# Patient Record
Sex: MALE | Race: WHITE | NOT HISPANIC OR LATINO | Employment: FULL TIME | URBAN - METROPOLITAN AREA
[De-identification: names, ages, dates, MRNs, and addresses within clinical notes are randomized per-mention and may not be internally consistent; named-entity substitution may affect disease eponyms.]

---

## 2023-04-23 DIAGNOSIS — K21.00 GASTROESOPHAGEAL REFLUX DISEASE WITH ESOPHAGITIS, UNSPECIFIED WHETHER HEMORRHAGE: Primary | ICD-10-CM

## 2023-04-25 PROBLEM — K21.9 ESOPHAGEAL REFLUX: Status: ACTIVE | Noted: 2023-04-25

## 2023-04-25 RX ORDER — PANTOPRAZOLE SODIUM 40 MG/1
TABLET, DELAYED RELEASE ORAL
Qty: 90 TABLET | Refills: 3 | Status: SHIPPED | OUTPATIENT
Start: 2023-04-25 | End: 2024-04-03 | Stop reason: SDUPTHER

## 2023-06-21 DIAGNOSIS — Z00.00 PREVENTATIVE HEALTH CARE: Primary | ICD-10-CM

## 2023-06-21 RX ORDER — IBUPROFEN 600 MG/1
TABLET ORAL
Qty: 90 TABLET | Refills: 1 | Status: SHIPPED | OUTPATIENT
Start: 2023-06-21 | End: 2023-09-08

## 2023-08-22 ENCOUNTER — LAB (OUTPATIENT)
Dept: LAB | Facility: LAB | Age: 63
End: 2023-08-22
Payer: COMMERCIAL

## 2023-08-22 ENCOUNTER — OFFICE VISIT (OUTPATIENT)
Dept: PRIMARY CARE | Facility: CLINIC | Age: 63
End: 2023-08-22
Payer: COMMERCIAL

## 2023-08-22 VITALS
HEART RATE: 93 BPM | DIASTOLIC BLOOD PRESSURE: 77 MMHG | BODY MASS INDEX: 30.31 KG/M2 | WEIGHT: 187.8 LBS | SYSTOLIC BLOOD PRESSURE: 126 MMHG

## 2023-08-22 DIAGNOSIS — K74.69 OTHER CIRRHOSIS OF LIVER (MULTI): Primary | ICD-10-CM

## 2023-08-22 DIAGNOSIS — Z00.00 PREVENTATIVE HEALTH CARE: ICD-10-CM

## 2023-08-22 DIAGNOSIS — E11.69 TYPE 2 DIABETES MELLITUS WITH OTHER SPECIFIED COMPLICATION, UNSPECIFIED WHETHER LONG TERM INSULIN USE (MULTI): ICD-10-CM

## 2023-08-22 DIAGNOSIS — K21.9 GASTROESOPHAGEAL REFLUX DISEASE WITHOUT ESOPHAGITIS: ICD-10-CM

## 2023-08-22 DIAGNOSIS — I10 BENIGN ESSENTIAL HYPERTENSION: ICD-10-CM

## 2023-08-22 PROBLEM — J30.9 ALLERGIC RHINITIS: Status: ACTIVE | Noted: 2023-08-22

## 2023-08-22 PROBLEM — M54.50 LOWER BACK PAIN: Status: ACTIVE | Noted: 2023-08-22

## 2023-08-22 PROBLEM — E78.5 HYPERLIPIDEMIA: Status: ACTIVE | Noted: 2023-08-22

## 2023-08-22 PROBLEM — J20.9 ACUTE BRONCHITIS: Status: RESOLVED | Noted: 2023-08-22 | Resolved: 2023-08-22

## 2023-08-22 PROBLEM — E66.9 OBESITY: Status: ACTIVE | Noted: 2023-08-22

## 2023-08-22 PROBLEM — S92.354A CLOSED NONDISPLACED FRACTURE OF FIFTH RIGHT METATARSAL BONE: Status: RESOLVED | Noted: 2023-08-22 | Resolved: 2023-08-22

## 2023-08-22 PROBLEM — S92.354A CLOSED NONDISPLACED FRACTURE OF FIFTH RIGHT METATARSAL BONE: Status: ACTIVE | Noted: 2023-08-22

## 2023-08-22 PROBLEM — J20.9 ACUTE BRONCHITIS: Status: ACTIVE | Noted: 2023-08-22

## 2023-08-22 PROBLEM — K29.60 REFLUX GASTRITIS: Status: ACTIVE | Noted: 2023-08-22

## 2023-08-22 PROBLEM — D69.6 THROMBOCYTOPENIA (CMS-HCC): Status: ACTIVE | Noted: 2023-08-22

## 2023-08-22 PROBLEM — G47.19 EXCESSIVE DAYTIME SLEEPINESS: Status: ACTIVE | Noted: 2023-08-22

## 2023-08-22 PROBLEM — E78.00 HYPERCHOLESTEROLEMIA: Status: ACTIVE | Noted: 2023-08-22

## 2023-08-22 PROBLEM — E11.9 DIABETES MELLITUS (MULTI): Status: ACTIVE | Noted: 2023-08-22

## 2023-08-22 PROBLEM — M20.21 ACQUIRED HALLUX RIGIDUS OF RIGHT FOOT: Status: ACTIVE | Noted: 2023-08-22

## 2023-08-22 PROBLEM — U07.1 COVID-19 VIRUS INFECTION: Status: ACTIVE | Noted: 2023-08-22

## 2023-08-22 PROBLEM — M51.26 LUMBAR DISC HERNIATION: Status: ACTIVE | Noted: 2023-08-22

## 2023-08-22 PROBLEM — M72.2 PLANTAR FASCIAL FIBROMATOSIS: Status: ACTIVE | Noted: 2017-06-26

## 2023-08-22 PROBLEM — N20.0 NEPHROLITHIASIS: Status: ACTIVE | Noted: 2023-08-22

## 2023-08-22 PROBLEM — K74.60 CIRRHOSIS (MULTI): Status: ACTIVE | Noted: 2023-08-22

## 2023-08-22 LAB
ALANINE AMINOTRANSFERASE (SGPT) (U/L) IN SER/PLAS: 37 U/L (ref 10–52)
ALBUMIN (G/DL) IN SER/PLAS: 4.9 G/DL (ref 3.4–5)
ALKALINE PHOSPHATASE (U/L) IN SER/PLAS: 47 U/L (ref 33–136)
ANION GAP IN SER/PLAS: 16 MMOL/L (ref 10–20)
ASPARTATE AMINOTRANSFERASE (SGOT) (U/L) IN SER/PLAS: 29 U/L (ref 9–39)
BASOPHILS (10*3/UL) IN BLOOD BY AUTOMATED COUNT: 0.07 X10E9/L (ref 0–0.1)
BASOPHILS/100 LEUKOCYTES IN BLOOD BY AUTOMATED COUNT: 0.9 % (ref 0–2)
BILIRUBIN TOTAL (MG/DL) IN SER/PLAS: 1.2 MG/DL (ref 0–1.2)
CALCIUM (MG/DL) IN SER/PLAS: 9.9 MG/DL (ref 8.6–10.6)
CARBON DIOXIDE, TOTAL (MMOL/L) IN SER/PLAS: 28 MMOL/L (ref 21–32)
CHLORIDE (MMOL/L) IN SER/PLAS: 101 MMOL/L (ref 98–107)
CHOLESTEROL (MG/DL) IN SER/PLAS: 148 MG/DL (ref 0–199)
CHOLESTEROL IN HDL (MG/DL) IN SER/PLAS: 65.1 MG/DL
CHOLESTEROL/HDL RATIO: 2.3
CREATININE (MG/DL) IN SER/PLAS: 0.96 MG/DL (ref 0.5–1.3)
EOSINOPHILS (10*3/UL) IN BLOOD BY AUTOMATED COUNT: 0.12 X10E9/L (ref 0–0.7)
EOSINOPHILS/100 LEUKOCYTES IN BLOOD BY AUTOMATED COUNT: 1.5 % (ref 0–6)
ERYTHROCYTE DISTRIBUTION WIDTH (RATIO) BY AUTOMATED COUNT: 14.5 % (ref 11.5–14.5)
ERYTHROCYTE MEAN CORPUSCULAR HEMOGLOBIN CONCENTRATION (G/DL) BY AUTOMATED: 30.8 G/DL (ref 32–36)
ERYTHROCYTE MEAN CORPUSCULAR VOLUME (FL) BY AUTOMATED COUNT: 84 FL (ref 80–100)
ERYTHROCYTES (10*6/UL) IN BLOOD BY AUTOMATED COUNT: 6.01 X10E12/L (ref 4.5–5.9)
GFR MALE: 88 ML/MIN/1.73M2
GLUCOSE (MG/DL) IN SER/PLAS: 129 MG/DL (ref 74–99)
HEMATOCRIT (%) IN BLOOD BY AUTOMATED COUNT: 50.6 % (ref 41–52)
HEMOGLOBIN (G/DL) IN BLOOD: 15.6 G/DL (ref 13.5–17.5)
IMMATURE GRANULOCYTES/100 LEUKOCYTES IN BLOOD BY AUTOMATED COUNT: 0.2 % (ref 0–0.9)
LDL: 64 MG/DL (ref 0–99)
LEUKOCYTES (10*3/UL) IN BLOOD BY AUTOMATED COUNT: 8.2 X10E9/L (ref 4.4–11.3)
LYMPHOCYTES (10*3/UL) IN BLOOD BY AUTOMATED COUNT: 1.04 X10E9/L (ref 1.2–4.8)
LYMPHOCYTES/100 LEUKOCYTES IN BLOOD BY AUTOMATED COUNT: 12.7 % (ref 13–44)
MONOCYTES (10*3/UL) IN BLOOD BY AUTOMATED COUNT: 0.63 X10E9/L (ref 0.1–1)
MONOCYTES/100 LEUKOCYTES IN BLOOD BY AUTOMATED COUNT: 7.7 % (ref 2–10)
NEUTROPHILS (10*3/UL) IN BLOOD BY AUTOMATED COUNT: 6.28 X10E9/L (ref 1.2–7.7)
NEUTROPHILS/100 LEUKOCYTES IN BLOOD BY AUTOMATED COUNT: 77 % (ref 40–80)
NRBC (PER 100 WBCS) BY AUTOMATED COUNT: 0 /100 WBC (ref 0–0)
PLATELETS (10*3/UL) IN BLOOD AUTOMATED COUNT: 155 X10E9/L (ref 150–450)
POC APPEARANCE, URINE: CLEAR
POC BILIRUBIN, URINE: NEGATIVE
POC BLOOD, URINE: NEGATIVE
POC COLOR, URINE: YELLOW
POC GLUCOSE, URINE: ABNORMAL MG/DL
POC HEMOGLOBIN A1C: 7.2 % (ref 4.2–6.5)
POC KETONES, URINE: ABNORMAL MG/DL
POC LEUKOCYTES, URINE: NEGATIVE
POC NITRITE,URINE: NEGATIVE
POC PH, URINE: 6 PH
POC PROTEIN, URINE: NEGATIVE MG/DL
POC SPECIFIC GRAVITY, URINE: 1.01
POC UROBILINOGEN, URINE: 1 EU/DL
POTASSIUM (MMOL/L) IN SER/PLAS: 5.6 MMOL/L (ref 3.5–5.3)
PROSTATE SPECIFIC AG (NG/ML) IN SER/PLAS: 1.52 NG/ML (ref 0–4)
PROTEIN TOTAL: 7.7 G/DL (ref 6.4–8.2)
SODIUM (MMOL/L) IN SER/PLAS: 139 MMOL/L (ref 136–145)
TRIGLYCERIDE (MG/DL) IN SER/PLAS: 93 MG/DL (ref 0–149)
UREA NITROGEN (MG/DL) IN SER/PLAS: 26 MG/DL (ref 6–23)
VLDL: 19 MG/DL (ref 0–40)

## 2023-08-22 PROCEDURE — 83036 HEMOGLOBIN GLYCOSYLATED A1C: CPT | Performed by: INTERNAL MEDICINE

## 2023-08-22 PROCEDURE — 81003 URINALYSIS AUTO W/O SCOPE: CPT | Performed by: INTERNAL MEDICINE

## 2023-08-22 PROCEDURE — 85025 COMPLETE CBC W/AUTO DIFF WBC: CPT

## 2023-08-22 PROCEDURE — 4010F ACE/ARB THERAPY RXD/TAKEN: CPT | Performed by: INTERNAL MEDICINE

## 2023-08-22 PROCEDURE — 80053 COMPREHEN METABOLIC PANEL: CPT

## 2023-08-22 PROCEDURE — 90677 PCV20 VACCINE IM: CPT | Performed by: INTERNAL MEDICINE

## 2023-08-22 PROCEDURE — 93000 ELECTROCARDIOGRAM COMPLETE: CPT | Performed by: INTERNAL MEDICINE

## 2023-08-22 PROCEDURE — 84153 ASSAY OF PSA TOTAL: CPT

## 2023-08-22 PROCEDURE — 80061 LIPID PANEL: CPT

## 2023-08-22 PROCEDURE — 3074F SYST BP LT 130 MM HG: CPT | Performed by: INTERNAL MEDICINE

## 2023-08-22 PROCEDURE — 3078F DIAST BP <80 MM HG: CPT | Performed by: INTERNAL MEDICINE

## 2023-08-22 PROCEDURE — 36415 COLL VENOUS BLD VENIPUNCTURE: CPT

## 2023-08-22 PROCEDURE — 99397 PER PM REEVAL EST PAT 65+ YR: CPT | Performed by: INTERNAL MEDICINE

## 2023-08-22 PROCEDURE — 90471 IMMUNIZATION ADMIN: CPT | Performed by: INTERNAL MEDICINE

## 2023-08-22 RX ORDER — PIOGLITAZONEHYDROCHLORIDE 15 MG/1
15 TABLET ORAL DAILY
COMMUNITY
Start: 2022-04-11 | End: 2024-01-08

## 2023-08-22 RX ORDER — EMPAGLIFLOZIN 25 MG/1
25 TABLET, FILM COATED ORAL DAILY
COMMUNITY
Start: 2017-02-08 | End: 2023-09-25

## 2023-08-22 RX ORDER — RAMIPRIL 5 MG/1
5 CAPSULE ORAL DAILY
COMMUNITY
Start: 2013-09-13 | End: 2023-09-25

## 2023-08-22 RX ORDER — MAGNESIUM 200 MG
1 TABLET ORAL 2 TIMES DAILY
COMMUNITY
Start: 2021-06-23

## 2023-08-22 RX ORDER — METFORMIN HYDROCHLORIDE 1000 MG/1
1000 TABLET ORAL
COMMUNITY
Start: 2014-08-10 | End: 2023-09-25

## 2023-08-22 RX ORDER — DULAGLUTIDE 4.5 MG/.5ML
4.5 INJECTION, SOLUTION SUBCUTANEOUS
COMMUNITY
Start: 2018-04-06 | End: 2024-03-11

## 2023-08-22 RX ORDER — FLUTICASONE PROPIONATE 50 MCG
2 SPRAY, SUSPENSION (ML) NASAL DAILY PRN
COMMUNITY
Start: 2013-04-15

## 2023-08-22 RX ORDER — SIMVASTATIN 40 MG/1
40 TABLET, FILM COATED ORAL DAILY
COMMUNITY
Start: 2013-04-15 | End: 2023-09-25

## 2023-08-22 RX ORDER — ASPIRIN 81 MG/1
81 TABLET ORAL DAILY
COMMUNITY

## 2023-08-22 NOTE — PROGRESS NOTES
Subjective   Patient ID: Teofilo Osei is a 63 y.o. male who presents for Follow-up.  WELLNESS VISIT. STILL ON COMMERCIAL INSURANCE  5th metatarsal fx healed well.  Back is sore daily each AM. Interferes with sleep unless he uses ibuprofen before bed. Sleeps well at night after 300mg ibuprofen in middle of night.  Colonoscopy is due 2028.  PSA due today.  ? Hearing.  Due for Prevnar.  Had eye exam with Dr. Collins. All DM issues were nonexistent.    Notes dyspnea with stairs. No angina.        Current Outpatient Medications   Medication Instructions    aspirin 81 mg, oral, Daily    cetirizine (ZYRTEC) 10 mg, oral, Daily    fluticasone (Flonase) 50 mcg/actuation nasal spray 2 sprays, Each Nostril, Daily PRN    ibuprofen 600 mg tablet TAKE 1 TABLET 3 TIMES DAILYAS NEEDED    Jardiance 25 mg, oral, Daily    magnesium 200 mg tablet 1 tablet, oral, 2 times daily    metFORMIN (GLUCOPHAGE) 1,000 mg, oral, 2 times daily with meals    pantoprazole (ProtoNix) 40 mg EC tablet TAKE 1 TABLET DAILY    pioglitazone (ACTOS) 15 mg, oral, Daily    ramipril (ALTACE) 5 mg, oral, Daily    simvastatin (ZOCOR) 40 mg, oral, Daily    Trulicity 4.5 mg, subcutaneous, Weekly     Review of Systems  General: Denies weakness, fever, anorexia. Denies significant change in weight.  HEENT: Denies HA, vision change or problem, tinnitus, voice or swallowing problems.  Resp: Denies SOB, cough, or wheezing.  CV: Denies chest pain or pressure, palpitations, syncope, edema, or claudication.  GI : Denies abdominal pain, diarrhea, constipation, heartburn, rectal bleeding, or change in bowel habits. Bowels better with addition of Mg++.  : Denies urinary frequency, pain, blood, incontinence, or nocturia.  Sexual: No sexual health or reproductive system concerns.  MSK: Denies significant musculoskeletal pains or limitations EXCEPT AS FOLLOWS...  Neuro: Denies tingling, numbness, weakness, tremor, balance problems, falls, or memory loss.  Psych: Denies Mood or  sleep issues.  Derm: No unusual lesions, moles or rashes.    Objective   Physical Exam  Constitutional: Alert and in no acute distress  Inspection of Eyes: Sclera and conjunctivae normal.  Pupil exam: PERRL. EOMI.  Tympanic membranes are normal. External ears appear normal.   Oropharynx: Normal with MMM.   Neck: Thyroid normal. No cervical lymphadenopathy. No carotid bruit.  No cervical, axillary, or inguinal lymphadenopathy.  Breasts: No masses.   Lungs are clear to auscultation and percussion.  Cardiac: S1 and S2 are normal. No murmurs, rubs, or gallops. No edema.   Abdomen: Soft, nontender. Normal bowel sounds. No hepatosplenomegaly or masses.  Musculoskeletal: Examination of gait is normal. No clubbing or cyanosis. Full range of motion of joints. NO swelling, tenderness, or limitation of motion.  Skin normal skin color and pigmentation. No visible rash. Nml skin turgor.  Neurological: Cranial nerves II-XII intact. Deep tendon reflexes 2+ and symmetric .No focal motor weakness. Sensation and vibration are normal. No pronator drift. Coordination normal. Balance normal.  Psychiatric: A&Ox3. Mood and affect normal.      Assessment/Plan   Problem List Items Addressed This Visit       Esophageal reflux    Benign essential hypertension     Well controlled. Continue Current Management         Cirrhosis (CMS/HCC) - Primary     Liver enzymes, PT/INR, US liver         Relevant Orders    US abdomen limited liver    Diabetes mellitus (CMS/HCC)     Degree of control is marginal. Due for urine albumin.         Relevant Orders    POCT glycosylated hemoglobin (Hb A1C) manually resulted (Completed)    Albumin , Urine Random    ECG 12 lead     Other Visit Diagnoses       Preventative health care        Relevant Orders    Pneumococcal conjugate vaccine, 20-valent, adult (PREVNAR 20)    Lipid Panel    Comprehensive Metabolic Panel    CBC and Auto Differential    POCT UA Automated manually resulted    Prostate Specific Antigen           F/U 3 months.

## 2023-08-23 NOTE — RESULT ENCOUNTER NOTE
Thad,  These tests indicate that your diabetes could be better controlled, as I alluded to at your visit.  Job

## 2023-08-23 NOTE — RESULT ENCOUNTER NOTE
Dear Patient,    GOOD NEWS    Attached to this note is a copy of the testing that I have ordered.The results indicate that there are no significant abnormalities in your results, even if some of the findings are reported as abnormal.    Please continue your current treatment plan as we have discussed and return for follow up as planned.    Do not hesitate to contact me if you have any questions or concerns.    Sincerely,  Job Ramos M.D.

## 2023-09-07 DIAGNOSIS — Z00.00 PREVENTATIVE HEALTH CARE: ICD-10-CM

## 2023-09-08 RX ORDER — IBUPROFEN 600 MG/1
600 TABLET ORAL 3 TIMES DAILY PRN
Qty: 90 TABLET | Refills: 1 | Status: SHIPPED | OUTPATIENT
Start: 2023-09-08 | End: 2023-11-15 | Stop reason: SDUPTHER

## 2023-09-22 DIAGNOSIS — I10 BENIGN ESSENTIAL HYPERTENSION: ICD-10-CM

## 2023-09-22 DIAGNOSIS — E11.69 TYPE 2 DIABETES MELLITUS WITH OTHER SPECIFIED COMPLICATION, UNSPECIFIED WHETHER LONG TERM INSULIN USE (MULTI): Primary | ICD-10-CM

## 2023-09-25 RX ORDER — SIMVASTATIN 40 MG/1
40 TABLET, FILM COATED ORAL EVERY EVENING
Qty: 90 TABLET | Refills: 3 | Status: SHIPPED | OUTPATIENT
Start: 2023-09-25

## 2023-09-25 RX ORDER — RAMIPRIL 5 MG/1
5 CAPSULE ORAL DAILY
Qty: 90 CAPSULE | Refills: 3 | Status: SHIPPED | OUTPATIENT
Start: 2023-09-25

## 2023-09-25 RX ORDER — EMPAGLIFLOZIN 25 MG/1
25 TABLET, FILM COATED ORAL DAILY
Qty: 90 TABLET | Refills: 3 | Status: SHIPPED | OUTPATIENT
Start: 2023-09-25 | End: 2024-05-14 | Stop reason: SDUPTHER

## 2023-09-25 RX ORDER — METFORMIN HYDROCHLORIDE 1000 MG/1
1000 TABLET ORAL EVERY 12 HOURS
Qty: 180 TABLET | Refills: 3 | Status: SHIPPED | OUTPATIENT
Start: 2023-09-25

## 2023-11-06 ENCOUNTER — TELEPHONE (OUTPATIENT)
Dept: PRIMARY CARE | Facility: CLINIC | Age: 63
End: 2023-11-06
Payer: COMMERCIAL

## 2023-11-06 NOTE — TELEPHONE ENCOUNTER
Pt is having swelling and pain rt side jaw pain. He would like to see if you ar able to squeeze him in Please advise.

## 2023-11-07 ENCOUNTER — OFFICE VISIT (OUTPATIENT)
Dept: PRIMARY CARE | Facility: CLINIC | Age: 63
End: 2023-11-07
Payer: COMMERCIAL

## 2023-11-07 VITALS
TEMPERATURE: 98.6 F | HEART RATE: 87 BPM | SYSTOLIC BLOOD PRESSURE: 132 MMHG | DIASTOLIC BLOOD PRESSURE: 71 MMHG | WEIGHT: 192.4 LBS | BODY MASS INDEX: 31.05 KG/M2

## 2023-11-07 DIAGNOSIS — R59.0 CERVICAL LYMPHADENOPATHY: ICD-10-CM

## 2023-11-07 DIAGNOSIS — J06.9 UPPER RESPIRATORY TRACT INFECTION, UNSPECIFIED TYPE: Primary | ICD-10-CM

## 2023-11-07 PROCEDURE — 3075F SYST BP GE 130 - 139MM HG: CPT | Performed by: INTERNAL MEDICINE

## 2023-11-07 PROCEDURE — 3078F DIAST BP <80 MM HG: CPT | Performed by: INTERNAL MEDICINE

## 2023-11-07 PROCEDURE — 99213 OFFICE O/P EST LOW 20 MIN: CPT | Performed by: INTERNAL MEDICINE

## 2023-11-07 PROCEDURE — 4010F ACE/ARB THERAPY RXD/TAKEN: CPT | Performed by: INTERNAL MEDICINE

## 2023-11-07 RX ORDER — AMOXICILLIN AND CLAVULANATE POTASSIUM 875; 125 MG/1; MG/1
875 TABLET, FILM COATED ORAL 2 TIMES DAILY
Qty: 14 TABLET | Refills: 0 | Status: SHIPPED | OUTPATIENT
Start: 2023-11-07 | End: 2023-11-14

## 2023-11-07 NOTE — PROGRESS NOTES
Subjective   Patient ID: Teofilo Osei is a 63 y.o. male who presents for No chief complaint on file..  URI since Thursday, COVID NEGATIVE. Developed swelling in R posterior cervical LN. Tender. No fever. Non-productive cough.      Current Outpatient Medications   Medication Instructions    aspirin 81 mg, oral, Daily    cetirizine (ZYRTEC) 10 mg, oral, Daily    fluticasone (Flonase) 50 mcg/actuation nasal spray 2 sprays, Each Nostril, Daily PRN     mg, oral, 3 times daily PRN    Jardiance 25 mg, oral, Daily    magnesium 200 mg tablet 1 tablet, oral, 2 times daily    metFORMIN (GLUCOPHAGE) 1,000 mg, oral, Every 12 hours    pantoprazole (ProtoNix) 40 mg EC tablet TAKE 1 TABLET DAILY    pioglitazone (ACTOS) 15 mg, oral, Daily    ramipril (ALTACE) 5 mg, oral, Daily    simvastatin (ZOCOR) 40 mg, oral, Every evening    Trulicity 4.5 mg, subcutaneous, Weekly     Review of Systems   All other systems reviewed and are negative.      Objective   Physical Exam  Constitutional:       Appearance: Normal appearance.   HENT:      Right Ear: Tympanic membrane, ear canal and external ear normal.      Left Ear: There is impacted cerumen.      Ears:      Comments: Tender and swolen R posterior auricular lymphadenopathy v. Parotid.     Mouth/Throat:      Mouth: Mucous membranes are moist.      Pharynx: No oropharyngeal exudate or posterior oropharyngeal erythema.   Eyes:      Extraocular Movements: Extraocular movements intact.      Pupils: Pupils are equal, round, and reactive to light.   Cardiovascular:      Rate and Rhythm: Normal rate and regular rhythm.   Pulmonary:      Effort: Pulmonary effort is normal. No respiratory distress.      Comments: Occasional ronchus R lung.  Lymphadenopathy:      Cervical: No cervical adenopathy.   Neurological:      Mental Status: He is alert.           Assessment/Plan   Diagnoses and all orders for this visit:  Upper respiratory tract infection, unspecified type  -     amoxicillin-pot  clavulanate (Augmentin) 875-125 mg tablet; Take 1 tablet (875 mg) by mouth 2 times a day for 7 days.  Cervical lymphadenopathy  -     amoxicillin-pot clavulanate (Augmentin) 875-125 mg tablet; Take 1 tablet (875 mg) by mouth 2 times a day for 7 days.       Call if not better.

## 2023-11-14 ENCOUNTER — OFFICE VISIT (OUTPATIENT)
Dept: PRIMARY CARE | Facility: CLINIC | Age: 63
End: 2023-11-14
Payer: COMMERCIAL

## 2023-11-14 VITALS
TEMPERATURE: 97.6 F | HEART RATE: 75 BPM | DIASTOLIC BLOOD PRESSURE: 73 MMHG | SYSTOLIC BLOOD PRESSURE: 110 MMHG | WEIGHT: 194 LBS | BODY MASS INDEX: 31.31 KG/M2

## 2023-11-14 DIAGNOSIS — E11.9 TYPE 2 DIABETES MELLITUS WITHOUT COMPLICATION, WITHOUT LONG-TERM CURRENT USE OF INSULIN (MULTI): Primary | ICD-10-CM

## 2023-11-14 DIAGNOSIS — I10 BENIGN ESSENTIAL HYPERTENSION: ICD-10-CM

## 2023-11-14 PROBLEM — U07.1 COVID-19 VIRUS INFECTION: Status: RESOLVED | Noted: 2023-08-22 | Resolved: 2023-11-14

## 2023-11-14 LAB — POC HEMOGLOBIN A1C: 7.8 % (ref 4.2–6.5)

## 2023-11-14 PROCEDURE — 83036 HEMOGLOBIN GLYCOSYLATED A1C: CPT | Performed by: INTERNAL MEDICINE

## 2023-11-14 PROCEDURE — 4010F ACE/ARB THERAPY RXD/TAKEN: CPT | Performed by: INTERNAL MEDICINE

## 2023-11-14 PROCEDURE — 1126F AMNT PAIN NOTED NONE PRSNT: CPT | Performed by: INTERNAL MEDICINE

## 2023-11-14 PROCEDURE — 3074F SYST BP LT 130 MM HG: CPT | Performed by: INTERNAL MEDICINE

## 2023-11-14 PROCEDURE — 3078F DIAST BP <80 MM HG: CPT | Performed by: INTERNAL MEDICINE

## 2023-11-14 PROCEDURE — 99214 OFFICE O/P EST MOD 30 MIN: CPT | Performed by: INTERNAL MEDICINE

## 2023-11-14 ASSESSMENT — PAIN SCALES - GENERAL: PAINLEVEL: 0-NO PAIN

## 2023-11-14 NOTE — ASSESSMENT & PLAN NOTE
Worsening diabetic control.  Hemoglobin A1c equals 7.8%.  I have once again recommended daily glucose monitoring.  Dietary restrictions are reinforced.  Follow-up mid February.  Goal is hemoglobin A1c less than 7% by then.  May need to consider a continuous glucose monitor to enhance awareness of the glycemic effects of food.

## 2023-11-14 NOTE — PROGRESS NOTES
Subjective   Patient ID: Teofilo Osei is a 63 y.o. male who presents for Follow-up (Pt still experiencing slight cough ).  URI sx and LN have improved. Finished ATB. No diarrhea.  Thad continues to note hearing trouble, mostly in larger crowds with ambient noise.    DM: He has not been been complying with his diet. He has been eating candy. No CP. He has stopped checking his glucose. He has stopped weighing himself.      Current Outpatient Medications   Medication Instructions    amoxicillin-pot clavulanate (Augmentin) 875-125 mg tablet 875 mg, oral, 2 times daily    aspirin 81 mg, oral, Daily    cetirizine (ZYRTEC) 10 mg, oral, Daily    fluticasone (Flonase) 50 mcg/actuation nasal spray 2 sprays, Each Nostril, Daily PRN     mg, oral, 3 times daily PRN    Jardiance 25 mg, oral, Daily    magnesium 200 mg tablet 1 tablet, oral, 2 times daily    metFORMIN (GLUCOPHAGE) 1,000 mg, oral, Every 12 hours    pantoprazole (ProtoNix) 40 mg EC tablet TAKE 1 TABLET DAILY    pioglitazone (ACTOS) 15 mg, oral, Daily    ramipril (ALTACE) 5 mg, oral, Daily    simvastatin (ZOCOR) 40 mg, oral, Every evening    Trulicity 4.5 mg, subcutaneous, Weekly     Review of Systems   All other systems reviewed and are negative.      Objective   /73 (BP Location: Left arm, Patient Position: Sitting, BP Cuff Size: Adult)   Pulse 75   Temp 36.4 °C (97.6 °F) (Oral)   Wt 88 kg (194 lb)   BMI 31.31 kg/m²   Physical Exam  Constitutional: Alert and in no acute distress  Inspection of Eyes: Sclera and conjunctivae normal.  Pupil exam: PERRL. EOMI.  Neck: Thyroid normal. No cervical lymphadenopathy.  Lungs are clear to auscultation and percussion.  Cardiac: S1 and S2 are normal. No murmurs, rubs, or gallops. No edema.   Musculoskeletal: Examination of gait is normal. No clubbing or cyanosis.   Skin normal skin color and pigmentation. No visible rash. Nml skin turgor.  Neurological: Cranial nerves II-XII intact. No focal motor weakness.  Coordination normal. Balance normal.  Psychiatric: A&Ox3. Mood and affect normal.      Assessment/Plan   Problem List Items Addressed This Visit             ICD-10-CM    Benign essential hypertension I10     Well controlled. Continue Current Management         Diabetes mellitus (CMS/[facility]) - Primary E11.9     Worsening diabetic control.  Hemoglobin A1c equals 7.8%.  I have once again recommended daily glucose monitoring.  Dietary restrictions are reinforced.  Follow-up mid February.  Goal is hemoglobin A1c less than 7% by then.  May need to consider a continuous glucose monitor to enhance awareness of the glycemic effects of food.          Respiratory infection has resolved.

## 2023-11-15 DIAGNOSIS — Z00.00 PREVENTATIVE HEALTH CARE: ICD-10-CM

## 2023-11-15 RX ORDER — IBUPROFEN 600 MG/1
600 TABLET ORAL 3 TIMES DAILY PRN
Qty: 90 TABLET | Refills: 1 | Status: SHIPPED | OUTPATIENT
Start: 2023-11-15 | End: 2023-12-26

## 2023-12-26 DIAGNOSIS — Z00.00 PREVENTATIVE HEALTH CARE: ICD-10-CM

## 2023-12-26 RX ORDER — IBUPROFEN 600 MG/1
TABLET ORAL
Qty: 90 TABLET | Refills: 1 | Status: SHIPPED | OUTPATIENT
Start: 2023-12-26 | End: 2024-02-12

## 2024-01-04 DIAGNOSIS — E11.9 TYPE 2 DIABETES MELLITUS WITHOUT COMPLICATION, WITHOUT LONG-TERM CURRENT USE OF INSULIN (MULTI): Primary | ICD-10-CM

## 2024-01-08 RX ORDER — PIOGLITAZONEHYDROCHLORIDE 15 MG/1
15 TABLET ORAL DAILY
Qty: 90 TABLET | Refills: 3 | Status: SHIPPED | OUTPATIENT
Start: 2024-01-08 | End: 2024-02-14 | Stop reason: SDUPTHER

## 2024-02-12 DIAGNOSIS — Z00.00 PREVENTATIVE HEALTH CARE: ICD-10-CM

## 2024-02-12 RX ORDER — IBUPROFEN 600 MG/1
TABLET ORAL
Qty: 90 TABLET | Refills: 1 | Status: SHIPPED | OUTPATIENT
Start: 2024-02-12 | End: 2024-04-03

## 2024-02-14 ENCOUNTER — LAB (OUTPATIENT)
Dept: LAB | Facility: LAB | Age: 64
End: 2024-02-14
Payer: COMMERCIAL

## 2024-02-14 ENCOUNTER — OFFICE VISIT (OUTPATIENT)
Dept: PRIMARY CARE | Facility: CLINIC | Age: 64
End: 2024-02-14
Payer: COMMERCIAL

## 2024-02-14 VITALS
DIASTOLIC BLOOD PRESSURE: 85 MMHG | WEIGHT: 191.6 LBS | SYSTOLIC BLOOD PRESSURE: 142 MMHG | HEART RATE: 97 BPM | BODY MASS INDEX: 30.93 KG/M2 | TEMPERATURE: 97.3 F

## 2024-02-14 DIAGNOSIS — E11.9 TYPE 2 DIABETES MELLITUS WITHOUT COMPLICATION, WITHOUT LONG-TERM CURRENT USE OF INSULIN (MULTI): ICD-10-CM

## 2024-02-14 DIAGNOSIS — I10 BENIGN ESSENTIAL HYPERTENSION: Primary | ICD-10-CM

## 2024-02-14 LAB
ALBUMIN SERPL BCP-MCNC: 5.4 G/DL (ref 3.4–5)
ALP SERPL-CCNC: 51 U/L (ref 33–136)
ALT SERPL W P-5'-P-CCNC: 37 U/L (ref 10–52)
ANION GAP SERPL CALC-SCNC: 16 MMOL/L (ref 10–20)
AST SERPL W P-5'-P-CCNC: 28 U/L (ref 9–39)
BASOPHILS # BLD AUTO: 0.07 X10*3/UL (ref 0–0.1)
BASOPHILS NFR BLD AUTO: 1 %
BILIRUB SERPL-MCNC: 1 MG/DL (ref 0–1.2)
BUN SERPL-MCNC: 27 MG/DL (ref 6–23)
CALCIUM SERPL-MCNC: 10.4 MG/DL (ref 8.6–10.6)
CHLORIDE SERPL-SCNC: 99 MMOL/L (ref 98–107)
CO2 SERPL-SCNC: 29 MMOL/L (ref 21–32)
CREAT SERPL-MCNC: 1.03 MG/DL (ref 0.5–1.3)
EGFRCR SERPLBLD CKD-EPI 2021: 81 ML/MIN/1.73M*2
EOSINOPHIL # BLD AUTO: 0.21 X10*3/UL (ref 0–0.7)
EOSINOPHIL NFR BLD AUTO: 2.9 %
ERYTHROCYTE [DISTWIDTH] IN BLOOD BY AUTOMATED COUNT: 14.5 % (ref 11.5–14.5)
GLUCOSE SERPL-MCNC: 206 MG/DL (ref 74–99)
HCT VFR BLD AUTO: 52.1 % (ref 41–52)
HGB BLD-MCNC: 16.7 G/DL (ref 13.5–17.5)
IMM GRANULOCYTES # BLD AUTO: 0.02 X10*3/UL (ref 0–0.7)
IMM GRANULOCYTES NFR BLD AUTO: 0.3 % (ref 0–0.9)
LYMPHOCYTES # BLD AUTO: 1.18 X10*3/UL (ref 1.2–4.8)
LYMPHOCYTES NFR BLD AUTO: 16.4 %
MCH RBC QN AUTO: 26.5 PG (ref 26–34)
MCHC RBC AUTO-ENTMCNC: 32.1 G/DL (ref 32–36)
MCV RBC AUTO: 83 FL (ref 80–100)
MONOCYTES # BLD AUTO: 0.71 X10*3/UL (ref 0.1–1)
MONOCYTES NFR BLD AUTO: 9.9 %
NEUTROPHILS # BLD AUTO: 4.99 X10*3/UL (ref 1.2–7.7)
NEUTROPHILS NFR BLD AUTO: 69.5 %
NRBC BLD-RTO: 0 /100 WBCS (ref 0–0)
PLATELET # BLD AUTO: 191 X10*3/UL (ref 150–450)
POC HEMOGLOBIN A1C: 7.6 % (ref 4.2–6.5)
POTASSIUM SERPL-SCNC: 4.8 MMOL/L (ref 3.5–5.3)
PROT SERPL-MCNC: 7.9 G/DL (ref 6.4–8.2)
RBC # BLD AUTO: 6.31 X10*6/UL (ref 4.5–5.9)
SODIUM SERPL-SCNC: 139 MMOL/L (ref 136–145)
WBC # BLD AUTO: 7.2 X10*3/UL (ref 4.4–11.3)

## 2024-02-14 PROCEDURE — 83036 HEMOGLOBIN GLYCOSYLATED A1C: CPT | Performed by: INTERNAL MEDICINE

## 2024-02-14 PROCEDURE — 1126F AMNT PAIN NOTED NONE PRSNT: CPT | Performed by: INTERNAL MEDICINE

## 2024-02-14 PROCEDURE — 3077F SYST BP >= 140 MM HG: CPT | Performed by: INTERNAL MEDICINE

## 2024-02-14 PROCEDURE — 3079F DIAST BP 80-89 MM HG: CPT | Performed by: INTERNAL MEDICINE

## 2024-02-14 PROCEDURE — 99214 OFFICE O/P EST MOD 30 MIN: CPT | Performed by: INTERNAL MEDICINE

## 2024-02-14 PROCEDURE — 36415 COLL VENOUS BLD VENIPUNCTURE: CPT

## 2024-02-14 PROCEDURE — 85025 COMPLETE CBC W/AUTO DIFF WBC: CPT

## 2024-02-14 PROCEDURE — 4010F ACE/ARB THERAPY RXD/TAKEN: CPT | Performed by: INTERNAL MEDICINE

## 2024-02-14 PROCEDURE — 80053 COMPREHEN METABOLIC PANEL: CPT

## 2024-02-14 RX ORDER — PIOGLITAZONEHYDROCHLORIDE 30 MG/1
30 TABLET ORAL DAILY
Qty: 90 TABLET | Refills: 3 | Status: SHIPPED | OUTPATIENT
Start: 2024-02-14 | End: 2025-02-13

## 2024-02-14 ASSESSMENT — PAIN SCALES - GENERAL: PAINLEVEL: 0-NO PAIN

## 2024-02-14 NOTE — ASSESSMENT & PLAN NOTE
This is elevated and more so than last time.  Will have to monitor and reevaluate. There is room to increase ramipril if needed, if elevation persists.

## 2024-02-14 NOTE — PROGRESS NOTES
Subjective   Patient ID: Teofilo Osei is a 63 y.o. male who presents for No chief complaint on file..  In for follow up.  No new health complaints.  AM glucose - 110-140.  No hospital visits.  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      Current Outpatient Medications   Medication Instructions    aspirin 81 mg, oral, Daily    cetirizine (ZYRTEC) 10 mg, oral, Daily    fluticasone (Flonase) 50 mcg/actuation nasal spray 2 sprays, Each Nostril, Daily PRN     mg tablet TAKE 1 TABLET 3 TIMES DAILYAS NEEDED FOR MILD PAIN    (1-3).    Jardiance 25 mg, oral, Daily    magnesium 200 mg tablet 1 tablet, oral, 2 times daily    metFORMIN (GLUCOPHAGE) 1,000 mg, oral, Every 12 hours    pantoprazole (ProtoNix) 40 mg EC tablet TAKE 1 TABLET DAILY    pioglitazone (ACTOS) 30 mg, oral, Daily    ramipril (ALTACE) 5 mg, oral, Daily    simvastatin (ZOCOR) 40 mg, oral, Every evening    Trulicity 4.5 mg, subcutaneous, Weekly     Review of Systems  All other systems are reviewed and are without complaint.    Objective   /85   Pulse 97   Temp 36.3 °C (97.3 °F)   Wt 86.9 kg (191 lb 9.6 oz)   BMI 30.93 kg/m²   Physical Exam  Constitutional: Alert and in no acute distress  Inspection of Eyes: Sclera and conjunctivae normal.  Pupil exam: PERRL. EOMI.  Neck: Thyroid normal. No cervical lymphadenopathy.  Lungs are clear to auscultation and percussion.  Cardiac: S1 and S2 are normal. No murmurs, rubs, or gallops. No edema.   Musculoskeletal: Examination of gait is normal. No clubbing or cyanosis.   Skin normal skin color and pigmentation. No visible rash. Nml skin turgor.  Neurological: Cranial nerves II-XII intact. No focal motor weakness. Coordination normal. Balance normal.  Psychiatric: A&Ox3. Mood and affect normal.      Assessment/Plan   Problem List Items Addressed This Visit             ICD-10-CM    Benign essential hypertension - Primary I10     This is elevated and more so than last time.  Will have to  monitor and reevaluate. There is room to increase ramipril if needed, if elevation persists.         Diabetes mellitus (CMS/HCC) E11.9     Strive for increase of pioglitazone to 30mg. Follow-up in 3 months.         Relevant Medications    pioglitazone (Actos) 30 mg tablet

## 2024-03-08 DIAGNOSIS — E11.9 TYPE 2 DIABETES MELLITUS WITHOUT COMPLICATION, WITHOUT LONG-TERM CURRENT USE OF INSULIN (MULTI): Primary | ICD-10-CM

## 2024-03-11 RX ORDER — DULAGLUTIDE 4.5 MG/.5ML
INJECTION, SOLUTION SUBCUTANEOUS
Qty: 6 ML | Refills: 3 | Status: SHIPPED | OUTPATIENT
Start: 2024-03-11 | End: 2024-05-31 | Stop reason: ALTCHOICE

## 2024-04-03 DIAGNOSIS — Z00.00 PREVENTATIVE HEALTH CARE: ICD-10-CM

## 2024-04-03 DIAGNOSIS — K21.00 GASTROESOPHAGEAL REFLUX DISEASE WITH ESOPHAGITIS, UNSPECIFIED WHETHER HEMORRHAGE: ICD-10-CM

## 2024-04-03 RX ORDER — IBUPROFEN 600 MG/1
TABLET ORAL
Qty: 90 TABLET | Refills: 3 | Status: SHIPPED | OUTPATIENT
Start: 2024-04-03

## 2024-04-03 RX ORDER — PANTOPRAZOLE SODIUM 40 MG/1
40 TABLET, DELAYED RELEASE ORAL DAILY
Qty: 90 TABLET | Refills: 3 | Status: SHIPPED | OUTPATIENT
Start: 2024-04-03

## 2024-05-14 DIAGNOSIS — E11.69 TYPE 2 DIABETES MELLITUS WITH OTHER SPECIFIED COMPLICATION, UNSPECIFIED WHETHER LONG TERM INSULIN USE (MULTI): ICD-10-CM

## 2024-05-15 ENCOUNTER — OFFICE VISIT (OUTPATIENT)
Dept: PRIMARY CARE | Facility: CLINIC | Age: 64
End: 2024-05-15
Payer: COMMERCIAL

## 2024-05-15 VITALS
HEART RATE: 91 BPM | TEMPERATURE: 98.5 F | DIASTOLIC BLOOD PRESSURE: 66 MMHG | SYSTOLIC BLOOD PRESSURE: 105 MMHG | WEIGHT: 195 LBS | BODY MASS INDEX: 31.47 KG/M2

## 2024-05-15 DIAGNOSIS — E11.69 TYPE 2 DIABETES MELLITUS WITH OTHER SPECIFIED COMPLICATION, UNSPECIFIED WHETHER LONG TERM INSULIN USE (MULTI): Primary | ICD-10-CM

## 2024-05-15 DIAGNOSIS — K74.69 OTHER CIRRHOSIS OF LIVER (MULTI): ICD-10-CM

## 2024-05-15 DIAGNOSIS — I10 BENIGN ESSENTIAL HYPERTENSION: ICD-10-CM

## 2024-05-15 LAB — POC HEMOGLOBIN A1C: 7.6 % (ref 4.2–6.5)

## 2024-05-15 PROCEDURE — 3074F SYST BP LT 130 MM HG: CPT | Performed by: INTERNAL MEDICINE

## 2024-05-15 PROCEDURE — 99214 OFFICE O/P EST MOD 30 MIN: CPT | Performed by: INTERNAL MEDICINE

## 2024-05-15 PROCEDURE — 1036F TOBACCO NON-USER: CPT | Performed by: INTERNAL MEDICINE

## 2024-05-15 PROCEDURE — 1126F AMNT PAIN NOTED NONE PRSNT: CPT | Performed by: INTERNAL MEDICINE

## 2024-05-15 PROCEDURE — 3078F DIAST BP <80 MM HG: CPT | Performed by: INTERNAL MEDICINE

## 2024-05-15 PROCEDURE — 83036 HEMOGLOBIN GLYCOSYLATED A1C: CPT | Performed by: INTERNAL MEDICINE

## 2024-05-15 PROCEDURE — 4010F ACE/ARB THERAPY RXD/TAKEN: CPT | Performed by: INTERNAL MEDICINE

## 2024-05-15 ASSESSMENT — PAIN SCALES - GENERAL: PAINLEVEL: 0-NO PAIN

## 2024-05-15 NOTE — PROGRESS NOTES
Subjective   Patient ID: Teofilo Osei is a 63 y.o. male who presents for Follow-up.  Some dental work remains in process and Thad is having some pain every night.  Taylor has had surgery and is still having pain in her arm.  Wonders about switch to Ozempic given its weight loss capacity.  Testing glucose each morning. ~ 130.    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        Current Outpatient Medications   Medication Instructions    aspirin 81 mg, oral, Daily    cetirizine (ZYRTEC) 10 mg, oral, Daily    empagliflozin (JARDIANCE) 25 mg, oral, Daily    fluticasone (Flonase) 50 mcg/actuation nasal spray 2 sprays, Each Nostril, Daily PRN    ibuprofen (IBU) 600 mg tablet TAKE 1 TABLET 3 TIMES DAILYAS NEEDED FOR MILD PAIN    (1-3).    magnesium 200 mg tablet 1 tablet, oral, 2 times daily    metFORMIN (GLUCOPHAGE) 1,000 mg, oral, Every 12 hours    pantoprazole (PROTONIX) 40 mg, oral, Daily, Do not crush, chew, or split.    pioglitazone (ACTOS) 30 mg, oral, Daily    ramipril (ALTACE) 5 mg, oral, Daily    simvastatin (ZOCOR) 40 mg, oral, Every evening    Trulicity 4.5 mg/0.5 mL pen injector INJECT 0.5ML               (4.5MG)SUBCUTANEOUSLY ONCE WEEKLY  (EVERY 7 DAYS)     Review of Systems  All other systems are reviewed and are without complaint.    Objective   /66 (BP Location: Left arm, Patient Position: Sitting, BP Cuff Size: Adult)   Pulse 91   Temp 36.9 °C (98.5 °F) (Oral)   Wt 88.5 kg (195 lb)   BMI 31.47 kg/m²   Physical Exam  Constitutional: Alert and in no acute distress  Inspection of Eyes: Sclera and conjunctivae normal.  Pupil exam: PERRL. EOMI.  Neck: Thyroid normal. No cervical lymphadenopathy.  Lungs are clear to auscultation and percussion.  Cardiac: S1 and S2 are normal. No murmurs, rubs, or gallops. No edema.   Musculoskeletal: Examination of gait is normal. No clubbing or cyanosis.   Skin normal skin color and pigmentation. No visible rash. Nml skin turgor.  Neurological: Cranial nerves II-XII intact.  No focal motor weakness. Coordination normal. Balance normal.  Psychiatric: A&Ox3. Mood and affect normal.      Assessment/Plan   Problem List Items Addressed This Visit             ICD-10-CM    Benign essential hypertension I10     Well controlled. Continue Current Management           Cirrhosis (Multi) K74.60    Relevant Orders    US abdomen limited liver    Diabetes mellitus (Multi) - Primary E11.9     Switch to Ozempic 2mg subcutaneous weekly, ? Better control + weight loss data.         Relevant Medications    semaglutide 2 mg/dose (8 mg/3 mL) pen injector (Start on 5/19/2024)    Other Relevant Orders    POCT glycosylated hemoglobin (Hb A1C) manually resulted (Completed)    Follow Up In [facility] Pharmacy

## 2024-05-20 ENCOUNTER — HOSPITAL ENCOUNTER (OUTPATIENT)
Dept: RADIOLOGY | Facility: HOSPITAL | Age: 64
Discharge: HOME | End: 2024-05-20
Payer: COMMERCIAL

## 2024-05-20 DIAGNOSIS — K74.69 OTHER CIRRHOSIS OF LIVER (MULTI): ICD-10-CM

## 2024-05-20 PROCEDURE — 76705 ECHO EXAM OF ABDOMEN: CPT | Performed by: RADIOLOGY

## 2024-05-20 PROCEDURE — 76705 ECHO EXAM OF ABDOMEN: CPT

## 2024-05-20 NOTE — RESULT ENCOUNTER NOTE
EXPECTED NEWS    Dear Thad,    Attached to this note is a copy of the testing that I have ordered.I have found abnormalities in your tests, but none that are unexpected.    MOST IMPORTANTLY, NO EVIDENCE OF HEPATIC TUMOR!!    Please continue your current treatment plan as we have discussed and return for follow up as planned.    Do not hesitate to contact me if you have any questions or concerns.    Sincerely,  Job Ramos M.D.

## 2024-05-21 DIAGNOSIS — E11.69 TYPE 2 DIABETES MELLITUS WITH OTHER SPECIFIED COMPLICATION, UNSPECIFIED WHETHER LONG TERM INSULIN USE (MULTI): Primary | ICD-10-CM

## 2024-05-31 ENCOUNTER — TELEMEDICINE (OUTPATIENT)
Dept: PHARMACY | Facility: HOSPITAL | Age: 64
End: 2024-05-31
Payer: COMMERCIAL

## 2024-05-31 DIAGNOSIS — E11.69 TYPE 2 DIABETES MELLITUS WITH OTHER SPECIFIED COMPLICATION, UNSPECIFIED WHETHER LONG TERM INSULIN USE (MULTI): ICD-10-CM

## 2024-05-31 ASSESSMENT — ENCOUNTER SYMPTOMS: DIABETIC ASSOCIATED SYMPTOMS: 0

## 2024-05-31 NOTE — ASSESSMENT & PLAN NOTE
Patient is currently taking Trulicity 4.5 mg weekly, pioglitazone 30 mg daily, and metformin 1,000 mg BID. He recently switched from Trulicity 4.5 mg to Ozempic 2 mg weekly for better weight loss benefit and blood sugar control. He took his last dose of Trulicity today and plans to start Ozempic on 6/7. Discussed administration, storage, and side effects. Made patient aware that PA was approved for Jardiance. He will call [facility] to follow-up. Patient also reports that he has gained 5 pounds since increasing pioglitazone dose to 30 mg. He would like to decrease dose in the future.     Plan:  START Ozempic 2 mg on Friday, 6/7  Continue all other current pharmacotherapy

## 2024-05-31 NOTE — PROGRESS NOTES
"      Patient ID: Teofilo Osei \"Thad\" is a 63 y.o. male who presents for Diabetes.    Referring Provider: Job Ramos MD  PCP: Job Ramos MD Last visit with PCP: 5/15/24 Next visit with PCP: 8/28/24      Subjective   Treatment Adherence:   Patient did take his medications today.   Number of missed doses in last 7 days: 0     Preferred pharmacy: [facility]  Can patient afford prescribed medications: Yes    Diabetes  He presents for his initial diabetic visit. He has type 2 diabetes mellitus. His disease course has been stable. There are no hypoglycemic associated symptoms. There are no diabetic associated symptoms. There are no hypoglycemic complications. Symptoms are stable. There are no diabetic complications. Risk factors for coronary artery disease include diabetes mellitus, male sex, hypertension and dyslipidemia. Current diabetic treatment includes oral agent (triple therapy) (+ GLP-1 RA). He is compliant with treatment all of the time. His breakfast blood glucose range is generally 130-140 mg/dl. An ACE inhibitor/angiotensin II receptor blocker is being taken.     Fasting Blood Glucose: Consistently 131     Objective     There were no vitals taken for this visit.   BP Readings from Last 4 Encounters:   05/15/24 105/66   02/14/24 142/85   11/14/23 110/73   11/07/23 132/71      There were no vitals filed for this visit.     Labs  Lab Results   Component Value Date    BILITOT 1.0 02/14/2024    CALCIUM 10.4 02/14/2024    CO2 29 02/14/2024    CL 99 02/14/2024    CREATININE 1.03 02/14/2024    GLUCOSE 206 (H) 02/14/2024    ALKPHOS 51 02/14/2024    K 4.8 02/14/2024    PROT 7.9 02/14/2024     02/14/2024    AST 28 02/14/2024    ALT 37 02/14/2024    BUN 27 (H) 02/14/2024    ANIONGAP 16 02/14/2024    ALBUMIN 5.4 (H) 02/14/2024    GFRMALE 88 08/22/2023     Lab Results   Component Value Date    TRIG 93 08/22/2023    CHOL 148 08/22/2023    HDL 65.1 08/22/2023     Lab Results "   Component Value Date    HGBA1C 7.6 (A) 05/15/2024       Current Outpatient Medications   Medication Instructions    aspirin 81 mg, oral, Daily    cetirizine (ZYRTEC) 10 mg, oral, Daily    empagliflozin (JARDIANCE) 25 mg, oral, Daily    fluticasone (Flonase) 50 mcg/actuation nasal spray 2 sprays, Each Nostril, Daily PRN    ibuprofen (IBU) 600 mg tablet TAKE 1 TABLET 3 TIMES DAILYAS NEEDED FOR MILD PAIN    (1-3).    magnesium 200 mg tablet 1 tablet, oral, 2 times daily    metFORMIN (GLUCOPHAGE) 1,000 mg, oral, Every 12 hours    pantoprazole (PROTONIX) 40 mg, oral, Daily, Do not crush, chew, or split.    pioglitazone (ACTOS) 30 mg, oral, Daily    ramipril (ALTACE) 5 mg, oral, Daily    semaglutide 2 mg, subcutaneous, Once Weekly    simvastatin (ZOCOR) 40 mg, oral, Every evening       Drug Interactions;  None requiring intervention at this time    Assessment/Plan   Problem List Items Addressed This Visit             ICD-10-CM    Diabetes mellitus (Multi) E11.9     Patient is currently taking Trulicity 4.5 mg weekly, pioglitazone 30 mg daily, and metformin 1,000 mg BID. He recently switched from Trulicity 4.5 mg to Ozempic 2 mg weekly for better weight loss benefit and blood sugar control. He took his last dose of Trulicity today and plans to start Ozempic on 6/7. Discussed administration, storage, and side effects. Made patient aware that PA was approved for Jardiance. He will call [facility] to follow-up. Patient also reports that he has gained 5 pounds since increasing pioglitazone dose to 30 mg. He would like to decrease dose in the future.     Plan:  START Ozempic 2 mg on Friday, 6/7  Continue all other current pharmacotherapy          Relevant Orders    Follow Up In [facility]     Follow-up: 4 weeks on July 5th at 2:30PM     Time spent with pt: Total length of time 60 (minutes) of the encounter and more than 50% was spent counseling the patient.    Mayra Ruiz, PharmD    Continue all meds  under the continuation of care with the referring provider and clinical pharmacy team.

## 2024-06-03 NOTE — PROGRESS NOTES
I reviewed the progress note and agree with the resident’s findings and plans as written. Case discussed with resident.    Yazmin Garcia, PharmD

## 2024-07-05 ENCOUNTER — APPOINTMENT (OUTPATIENT)
Dept: PHARMACY | Facility: HOSPITAL | Age: 64
End: 2024-07-05
Payer: COMMERCIAL

## 2024-07-05 DIAGNOSIS — E11.69 TYPE 2 DIABETES MELLITUS WITH OTHER SPECIFIED COMPLICATION, UNSPECIFIED WHETHER LONG TERM INSULIN USE (MULTI): ICD-10-CM

## 2024-07-05 NOTE — PROGRESS NOTES
"Pharmacist Clinic: Diabetes Management  Teofilo BECKFORD Osei \"Thad\" is a 64 y.o. male was referred to Clinical Pharmacy Team for diabetes management.   Referring Provider: Job Ramos MD  - Last visit with referring provider: 5/15/24    Subjective     HPI    Current diet:   - Ozempic helping with binge/emotional eating  - 2-3 meals/day  - Watches carb/sugar intake, but admits to eating fruit    Current exercise:  - Spends 1/2 the year in [place]; will swim in the morning there  - Walks dogs when at home in [place]     Weight loss:  - Lost 3-5 lbs since starting Ozempic    Current monitoring regimen:   Patient is using: glucometer    Reported blood sugars: 's    Any episodes of hypoglycemia? no    Adverse Effects:   Feels burning sensation on skin around torso, but there are no physical manifestations (I.e. rash, redness, swelling, itchiness); not overly bothersome    Objective     There were no vitals taken for this visit.    Allergies   Allergen Reactions    Terbinafine Hives and Other    Tetracyclines Hives       Current Diabetes Pharmacotherapy:    Ozempic 2 mg weekly  Jardiance 25 mg daily  Metformin 1000 mg twice daily  Pioglitazone 30 mg daily    Historical Diabetes Pharmacotherapy:  Trulicity    SECONDARY PREVENTION  - Statin? Yes   - ACE-I/ARB? Yes  - Aspirin? Yes    Pertinent PMH Review:  - PMH of Pancreatitis: No  - PMH of Retinopathy: No  - PMH of Urinary Tract Infections: No  - PMH of MTC: No    Lab Review  Lab Results   Component Value Date    BILITOT 1.0 02/14/2024    CALCIUM 10.4 02/14/2024    CO2 29 02/14/2024    CL 99 02/14/2024    CREATININE 1.03 02/14/2024    GLUCOSE 206 (H) 02/14/2024    ALKPHOS 51 02/14/2024    K 4.8 02/14/2024    PROT 7.9 02/14/2024     02/14/2024    AST 28 02/14/2024    ALT 37 02/14/2024    BUN 27 (H) 02/14/2024    ANIONGAP 16 02/14/2024    ALBUMIN 5.4 (H) 02/14/2024    GFRMALE 88 08/22/2023     Lab Results   Component Value Date    TRIG 93 08/22/2023    CHOL 148 " 08/22/2023    HDL 65.1 08/22/2023     Lab Results   Component Value Date    HGBA1C 7.6 (A) 05/15/2024    HGBA1C 7.6 (A) 02/14/2024    HGBA1C 7.8 (A) 11/14/2023     The 10-year ASCVD risk score (Melvin MONTOYA, et al., 2019) is: 15%    Values used to calculate the score:      Age: 64 years      Sex: Male      Is Non- : No      Diabetic: Yes      Tobacco smoker: No      Systolic Blood Pressure: 105 mmHg      Is BP treated: Yes      HDL Cholesterol: 65.1 mg/dL      Total Cholesterol: 148 mg/dL    Drug Interactions:  None     Affordability/Accessibility:  No issues    Assessment/Plan   Problem List Items Addressed This Visit       Diabetes mellitus (Multi)    Relevant Orders    Follow Up In [facility] - Pharmacy     ASSESSMENT:  Patients diabetes is poorly controlled with most recent A1c of 7.6%. (Goals: A1c <7; FBG ; 2HPP <180).   Rationale for plan: Patient doing well with Ozempic so far and has noticed some changes with appetite/cravings and has lost a few lbs! Blood sugars also improving. He has noticed an interesting effect of a burning sensation on his skin, but there are no physical manifestations and no other systemic effects so I'm not concerned about an allergic response. It is not overly bothersome for him either.    PLAN:  CONTINUE all current pharmacotherapy  Test blood sugars: daily  Continue working on diet and exercise  Follow up with clinical pharmacist: 9/6 11am  Follow up with PCP: 8/28/24    Thank you,  Yazmin Garcia, PharmD  Clinical Pharmacy Specialist  [phone]  [email or web address]     Continue all meds under the continuation of care with the referring provider and clinical pharmacy team.

## 2024-07-22 DIAGNOSIS — E11.69 TYPE 2 DIABETES MELLITUS WITH OTHER SPECIFIED COMPLICATION, UNSPECIFIED WHETHER LONG TERM INSULIN USE (MULTI): Primary | ICD-10-CM

## 2024-07-22 RX ORDER — TIRZEPATIDE 5 MG/.5ML
5 INJECTION, SOLUTION SUBCUTANEOUS
Qty: 6 ML | Refills: 3 | Status: SHIPPED | OUTPATIENT
Start: 2024-07-28 | End: 2025-07-28

## 2024-07-30 ENCOUNTER — TELEPHONE (OUTPATIENT)
Dept: PRIMARY CARE | Facility: CLINIC | Age: 64
End: 2024-07-30
Payer: COMMERCIAL

## 2024-07-31 ENCOUNTER — TELEPHONE (OUTPATIENT)
Dept: PRIMARY CARE | Facility: CLINIC | Age: 64
End: 2024-07-31
Payer: COMMERCIAL

## 2024-07-31 DIAGNOSIS — E11.69 TYPE 2 DIABETES MELLITUS WITH OTHER SPECIFIED COMPLICATION, UNSPECIFIED WHETHER LONG TERM INSULIN USE (MULTI): ICD-10-CM

## 2024-07-31 RX ORDER — TIRZEPATIDE 5 MG/.5ML
5 INJECTION, SOLUTION SUBCUTANEOUS
Qty: 6 ML | Refills: 3 | Status: SHIPPED | OUTPATIENT
Start: 2024-08-04 | End: 2025-08-04

## 2024-08-28 ENCOUNTER — APPOINTMENT (OUTPATIENT)
Dept: PRIMARY CARE | Facility: CLINIC | Age: 64
End: 2024-08-28
Payer: COMMERCIAL

## 2024-09-01 DIAGNOSIS — Z00.00 PREVENTATIVE HEALTH CARE: ICD-10-CM

## 2024-09-03 RX ORDER — IBUPROFEN 600 MG/1
TABLET ORAL
Qty: 90 TABLET | Refills: 3 | Status: SHIPPED | OUTPATIENT
Start: 2024-09-03

## 2024-09-04 ENCOUNTER — APPOINTMENT (OUTPATIENT)
Dept: PRIMARY CARE | Facility: CLINIC | Age: 64
End: 2024-09-04
Payer: COMMERCIAL

## 2024-09-04 VITALS
HEIGHT: 70 IN | BODY MASS INDEX: 27.69 KG/M2 | HEART RATE: 79 BPM | TEMPERATURE: 97.4 F | SYSTOLIC BLOOD PRESSURE: 103 MMHG | DIASTOLIC BLOOD PRESSURE: 65 MMHG | WEIGHT: 193.4 LBS

## 2024-09-04 DIAGNOSIS — E11.69 TYPE 2 DIABETES MELLITUS WITH OTHER SPECIFIED COMPLICATION, UNSPECIFIED WHETHER LONG TERM INSULIN USE (MULTI): Primary | ICD-10-CM

## 2024-09-04 DIAGNOSIS — E78.00 HYPERCHOLESTEROLEMIA: ICD-10-CM

## 2024-09-04 DIAGNOSIS — Z12.5 PROSTATE CANCER SCREENING: ICD-10-CM

## 2024-09-04 DIAGNOSIS — I10 BENIGN ESSENTIAL HYPERTENSION: ICD-10-CM

## 2024-09-04 DIAGNOSIS — K74.69 OTHER CIRRHOSIS OF LIVER (MULTI): ICD-10-CM

## 2024-09-04 DIAGNOSIS — Z00.00 PREVENTATIVE HEALTH CARE: ICD-10-CM

## 2024-09-04 LAB — POC HEMOGLOBIN A1C: 7.5 % (ref 4.2–6.5)

## 2024-09-04 PROCEDURE — 1036F TOBACCO NON-USER: CPT | Performed by: INTERNAL MEDICINE

## 2024-09-04 PROCEDURE — 83036 HEMOGLOBIN GLYCOSYLATED A1C: CPT | Performed by: INTERNAL MEDICINE

## 2024-09-04 PROCEDURE — 1126F AMNT PAIN NOTED NONE PRSNT: CPT | Performed by: INTERNAL MEDICINE

## 2024-09-04 PROCEDURE — 99397 PER PM REEVAL EST PAT 65+ YR: CPT | Performed by: INTERNAL MEDICINE

## 2024-09-04 PROCEDURE — 3074F SYST BP LT 130 MM HG: CPT | Performed by: INTERNAL MEDICINE

## 2024-09-04 PROCEDURE — 3078F DIAST BP <80 MM HG: CPT | Performed by: INTERNAL MEDICINE

## 2024-09-04 PROCEDURE — 4010F ACE/ARB THERAPY RXD/TAKEN: CPT | Performed by: INTERNAL MEDICINE

## 2024-09-04 PROCEDURE — 3008F BODY MASS INDEX DOCD: CPT | Performed by: INTERNAL MEDICINE

## 2024-09-04 RX ORDER — TIRZEPATIDE 7.5 MG/.5ML
7.5 INJECTION, SOLUTION SUBCUTANEOUS WEEKLY
Qty: 18 ML | Refills: 3 | Status: SHIPPED | OUTPATIENT
Start: 2024-09-04 | End: 2024-09-06 | Stop reason: SDUPTHER

## 2024-09-04 ASSESSMENT — ENCOUNTER SYMPTOMS
DEPRESSION: 0
LOSS OF SENSATION IN FEET: 0
OCCASIONAL FEELINGS OF UNSTEADINESS: 0

## 2024-09-04 ASSESSMENT — PAIN SCALES - GENERAL: PAINLEVEL: 0-NO PAIN

## 2024-09-04 NOTE — PROGRESS NOTES
"Subjective   Patient ID: Teofilo Osei \"Teresa" is a 64 y.o. male who presents for Annual Exam.  Doing well.  Gets leg cramps, particularly after flying.  Has been doing more walking. Swims in [place].  Uses ibuprofen on occasion. (!every other day)      Current Outpatient Medications   Medication Instructions    aspirin 81 mg, oral, Daily    cetirizine (ZYRTEC) 10 mg, oral, Daily    empagliflozin (JARDIANCE) 25 mg, oral, Daily    fluticasone (Flonase) 50 mcg/actuation nasal spray 2 sprays, Each Nostril, Daily PRN     mg tablet TAKE 1 TABLET 3 TIMES DAILYAS NEEDED FOR MILD PAIN    (1-3).    magnesium 200 mg tablet 1 tablet, oral, 2 times daily    metFORMIN (GLUCOPHAGE) 1,000 mg, oral, Every 12 hours    Mounjaro 7.5 mg, subcutaneous, Weekly    pantoprazole (PROTONIX) 40 mg, oral, Daily, Do not crush, chew, or split.    pioglitazone (ACTOS) 30 mg, oral, Daily    ramipril (ALTACE) 5 mg, oral, Daily    simvastatin (ZOCOR) 40 mg, oral, Every evening     Review of Systems  General: Denies weakness, fever, anorexia. Denies significant change in weight.  HEENT: Denies HA, vision change or problem, hearing loss or tinnitus, voice or swallowing problems.  Resp: Denies SOB, cough, or wheezing.  CV: Denies chest pain or pressure, palpitations, syncope, edema, or claudication.  GI : Denies abdominal pain, diarrhea, constipation, heartburn, rectal bleeding, or change in bowel habits.  : Denies urinary frequency, pain, blood, incontinence, or nocturia.  Sexual: No sexual health or reproductive system concerns.  MSK: Denies significant musculoskeletal pains or limitations EXCEPT AS FOLLOWS...none  Neuro: Denies tingling, numbness, weakness, tremor, balance problems, falls, or memory loss.  Psych: Denies Mood or sleep issues.  Derm: No unusual lesions, moles or rashes.      Objective   /65 (BP Location: Left arm, Patient Position: Sitting, BP Cuff Size: Adult)   Pulse 79   Temp 36.3 °C (97.4 °F) (Oral)   Ht 1.765 m (5' " "9.5\")   Wt 87.7 kg (193 lb 6.4 oz)   BMI 28.15 kg/m²   Physical Exam  Constitutional: Alert and in no acute distress  Inspection of Eyes: Sclera and conjunctivae normal.  Pupil exam: PERRL. EOMI.  Tympanic membranes are normal. External ears appear normal.   Oropharynx: Normal with MMM.   Neck: Thyroid normal. No cervical lymphadenopathy. No carotid bruit.  No cervical, axillary, or inguinal lymphadenopathy.  Breasts: No masses.   Lungs are clear to auscultation and percussion.  Cardiac: S1 and S2 are normal. No murmurs, rubs, or gallops. No edema.   Abdomen: Soft, nontender. Normal bowel sounds. No hepatosplenomegaly or masses.  Musculoskeletal: Examination of gait is normal. No clubbing or cyanosis. Full range of motion of joints. NO swelling, tenderness, or limitation of motion.  Skin normal skin color and pigmentation. No visible rash. Nml skin turgor.  Neurological: Cranial nerves II-XII intact. Deep tendon reflexes 2+ and symmetric. No focal motor weakness. Sensation and vibration are normal. No pronator drift. Coordination normal. Balance normal.  Psychiatric: A&Ox3. Mood and affect normal.  Feet:      Right foot:      Protective Sensation: 5 sites tested.        Skin integrity: Skin integrity normal.      Left foot:      Protective Sensation: 5 sites tested.        Skin integrity: Skin integrity normal.      Comments: Sensation is normal to filament      Assessment/Plan   Problem List Items Addressed This Visit             ICD-10-CM    Benign essential hypertension I10     Well controlled. Continue Current Management           Cirrhosis (Multi) K74.60    Relevant Orders    US abdomen limited liver    Diabetes mellitus (Multi) - Primary E11.9     Control is fair. Increase Mounjaro to 7.5mg/week for better control and increased weight loss.         Relevant Medications    tirzepatide (Mounjaro) 7.5 mg/0.5 mL pen injector    Other Relevant Orders    POCT glycosylated hemoglobin (Hb A1C) manually resulted " (Completed)    CBC and Auto Differential    Comprehensive Metabolic Panel    Lipid Panel    Albumin-Creatinine Ratio, Urine Random    Hypercholesterolemia E78.00    Preventative health care Z00.00     Colonoscopy UTD.  Order PSA.  Influenza vaccine.  Had COVID 3 weeks ago so now shot is not currently needed.         Relevant Orders    Prostate Specific Antigen, Screen     Other Visit Diagnoses         Codes    Prostate cancer screening     Z12.5    Relevant Orders    Prostate Specific Antigen, Screen

## 2024-09-04 NOTE — ASSESSMENT & PLAN NOTE
Colonoscopy UTD.  Order PSA.  Influenza vaccine.  Had COVID 3 weeks ago so now shot is not currently needed.

## 2024-09-04 NOTE — PROGRESS NOTES
"Pharmacist Clinic: Diabetes Management  Teofilo Osei \"Thad\" is a 64 y.o. male was referred to Clinical Pharmacy Team for diabetes management.   Referring Provider: Job Ramos MD  - Last visit with referring provider: 9/4/24  - Next visit with referring provider: Not scheduled     Subjective     HPI    Patient is a 64 y.o. male presenting to a follow up clinical pharmacy visit for diabetes management. Since the last visit the patient was switched from Ozempic to Mounjaro due to side effects from Mounjaro. He denied any side effects with Mounjaro and stated he feels significantly better on this medication. He has taken 4 doses of the Mounjaro 5 mg and his PCP increased the dose to 7.5 mg on 9/4/24 which he has not started yet.     Current diet:   - Medication helping with binge/emotional eating  - 2-3 meals/day  - Watches carb/sugar intake, but admits to eating fruit    Current exercise:  - Spends 1/2 the year in [place]; will swim in the morning there  - Walks dogs when at home in [place]   - Walking more with better weather    Weight loss:  - Lost 2 lbs since starting Mounjaro     Current monitoring regimen:   Patient is using: glucometer    Reported blood sugars:  mg/dL    Any episodes of hypoglycemia? no; Occasionally has 1x/month     Adverse Effects:   Feels burning sensation on skin around torso, but there are no physical manifestations (I.e. rash, redness, swelling, itchiness); not overly bothersome    Objective     There were no vitals taken for this visit.    Allergies   Allergen Reactions    Terbinafine Hives and Other    Tetracyclines Hives       Current Diabetes Pharmacotherapy:    Mounjaro 7.5 mg weekly  Jardiance 25 mg daily  Metformin 1000 mg twice daily  Pioglitazone 30 mg daily    Historical Diabetes Pharmacotherapy:  Trulicity    SECONDARY PREVENTION  - Statin? Yes   - ACE-I/ARB? Yes  - Aspirin? Yes    Pertinent PMH Review:  - PMH of Pancreatitis: No  - PMH of Retinopathy: No  - PMH of Urinary " Tract Infections: No  - PMH of MTC: No    Lab Review  Lab Results   Component Value Date    BILITOT 1.0 02/14/2024    CALCIUM 10.4 02/14/2024    CO2 29 02/14/2024    CL 99 02/14/2024    CREATININE 1.03 02/14/2024    GLUCOSE 206 (H) 02/14/2024    ALKPHOS 51 02/14/2024    K 4.8 02/14/2024    PROT 7.9 02/14/2024     02/14/2024    AST 28 02/14/2024    ALT 37 02/14/2024    BUN 27 (H) 02/14/2024    ANIONGAP 16 02/14/2024    ALBUMIN 5.4 (H) 02/14/2024    GFRMALE 88 08/22/2023     Lab Results   Component Value Date    TRIG 93 08/22/2023    CHOL 148 08/22/2023    HDL 65.1 08/22/2023     Lab Results   Component Value Date    HGBA1C 7.5 (A) 09/04/2024    HGBA1C 7.6 (A) 05/15/2024    HGBA1C 7.6 (A) 02/14/2024     The 10-year ASCVD risk score (Melvin MONTOYA, et al., 2019) is: 14.6%    Values used to calculate the score:      Age: 64 years      Sex: Male      Is Non- : No      Diabetic: Yes      Tobacco smoker: No      Systolic Blood Pressure: 103 mmHg      Is BP treated: Yes      HDL Cholesterol: 65.1 mg/dL      Total Cholesterol: 148 mg/dL    Drug Interactions:  None     Affordability/Accessibility:  No issues    Assessment/Plan   Problem List Items Addressed This Visit       Diabetes mellitus (Multi)    Relevant Medications    tirzepatide (Mounjaro) 7.5 mg/0.5 mL pen injector    Other Relevant Orders    Follow Up In [facility] - Pharmacy     ASSESSMENT:  Patients diabetes is uncontrolled with most recent A1c of 7.5%. (Goals: A1c <7; FBG ; 2HPP <180).   Rationale for plan: Patient saw PCP on 9/4 who just increased his dose of Mounjaro. He has not taken the first dose of the 7.5 mg yet as he has not received it from the pharmacy. Therefore, since medication adjustments were just made and he is tolerating Mounjaro well, no medication adjustments will be made.     PLAN:  CONTINUE all current pharmacotherapy  Re-sent Mounjaro 7.5 mg prescription to [facility].  E-prescribing failing. Therefore, will hard fax to [facility].   Contacted [facility] who said a new PA was needed for the increased 7.5 mg dose   PA KEY: [id number]  Test blood sugars: daily  Continue working on diet and exercise  Provided patient with [facility] phone number for any additional questions or concerns prior to follow up visit [phone]    Follow up with Clinical Pharmacy Team 10/2/2024 at 11 am    Time spent with pt: Total length of time 20 (minutes) of the encounter and more than 50% was spent counseling the patient.    Continue all meds under the continuation of care with the referring provider and clinical pharmacy team.    Please reach out to the Clinical Pharmacy Team if there are any further questions.     Verbal consent to manage patient's drug therapy was obtained from patient. They were informed they may decline to participate or withdraw from participation in pharmacy services at any time.    Monae Hernandez, PharmD  Clinical Pharmacy Specialist   [phone]

## 2024-09-06 ENCOUNTER — APPOINTMENT (OUTPATIENT)
Dept: PHARMACY | Facility: HOSPITAL | Age: 64
End: 2024-09-06
Payer: COMMERCIAL

## 2024-09-06 DIAGNOSIS — E11.69 TYPE 2 DIABETES MELLITUS WITH OTHER SPECIFIED COMPLICATION, UNSPECIFIED WHETHER LONG TERM INSULIN USE (MULTI): ICD-10-CM

## 2024-09-06 RX ORDER — TIRZEPATIDE 7.5 MG/.5ML
7.5 INJECTION, SOLUTION SUBCUTANEOUS WEEKLY
Qty: 2 ML | Refills: 3 | Status: SHIPPED | OUTPATIENT
Start: 2024-09-06

## 2024-09-06 RX ORDER — TIRZEPATIDE 7.5 MG/.5ML
7.5 INJECTION, SOLUTION SUBCUTANEOUS WEEKLY
Qty: 2 ML | Refills: 3 | Status: SHIPPED | OUTPATIENT
Start: 2024-09-06 | End: 2024-09-06

## 2024-09-09 ENCOUNTER — LAB (OUTPATIENT)
Dept: LAB | Facility: LAB | Age: 64
End: 2024-09-09
Payer: COMMERCIAL

## 2024-09-09 DIAGNOSIS — E11.69 TYPE 2 DIABETES MELLITUS WITH OTHER SPECIFIED COMPLICATION, UNSPECIFIED WHETHER LONG TERM INSULIN USE (MULTI): ICD-10-CM

## 2024-09-09 DIAGNOSIS — Z00.00 PREVENTATIVE HEALTH CARE: ICD-10-CM

## 2024-09-09 DIAGNOSIS — Z12.5 PROSTATE CANCER SCREENING: ICD-10-CM

## 2024-09-09 LAB
ALBUMIN SERPL BCP-MCNC: 4.8 G/DL (ref 3.4–5)
ALP SERPL-CCNC: 59 U/L (ref 33–136)
ALT SERPL W P-5'-P-CCNC: 37 U/L (ref 10–52)
ANION GAP SERPL CALC-SCNC: 13 MMOL/L (ref 10–20)
AST SERPL W P-5'-P-CCNC: 30 U/L (ref 9–39)
BASOPHILS # BLD AUTO: 0.06 X10*3/UL (ref 0–0.1)
BASOPHILS NFR BLD AUTO: 1 %
BILIRUB SERPL-MCNC: 0.9 MG/DL (ref 0–1.2)
BUN SERPL-MCNC: 22 MG/DL (ref 6–23)
CALCIUM SERPL-MCNC: 10 MG/DL (ref 8.6–10.6)
CHLORIDE SERPL-SCNC: 101 MMOL/L (ref 98–107)
CHOLEST SERPL-MCNC: 149 MG/DL (ref 0–199)
CHOLESTEROL/HDL RATIO: 2.2
CO2 SERPL-SCNC: 31 MMOL/L (ref 21–32)
CREAT SERPL-MCNC: 1.02 MG/DL (ref 0.5–1.3)
CREAT UR-MCNC: 65.9 MG/DL (ref 20–370)
EGFRCR SERPLBLD CKD-EPI 2021: 81 ML/MIN/1.73M*2
EOSINOPHIL # BLD AUTO: 0.23 X10*3/UL (ref 0–0.7)
EOSINOPHIL NFR BLD AUTO: 3.9 %
ERYTHROCYTE [DISTWIDTH] IN BLOOD BY AUTOMATED COUNT: 14.3 % (ref 11.5–14.5)
GLUCOSE SERPL-MCNC: 176 MG/DL (ref 74–99)
HCT VFR BLD AUTO: 47.5 % (ref 41–52)
HDLC SERPL-MCNC: 68 MG/DL
HGB BLD-MCNC: 15.1 G/DL (ref 13.5–17.5)
IMM GRANULOCYTES # BLD AUTO: 0.02 X10*3/UL (ref 0–0.7)
IMM GRANULOCYTES NFR BLD AUTO: 0.3 % (ref 0–0.9)
LDLC SERPL CALC-MCNC: 62 MG/DL
LYMPHOCYTES # BLD AUTO: 1.09 X10*3/UL (ref 1.2–4.8)
LYMPHOCYTES NFR BLD AUTO: 18.5 %
MCH RBC QN AUTO: 26.1 PG (ref 26–34)
MCHC RBC AUTO-ENTMCNC: 31.8 G/DL (ref 32–36)
MCV RBC AUTO: 82 FL (ref 80–100)
MICROALBUMIN UR-MCNC: <7 MG/L
MICROALBUMIN/CREAT UR: NORMAL MG/G{CREAT}
MONOCYTES # BLD AUTO: 0.6 X10*3/UL (ref 0.1–1)
MONOCYTES NFR BLD AUTO: 10.2 %
NEUTROPHILS # BLD AUTO: 3.9 X10*3/UL (ref 1.2–7.7)
NEUTROPHILS NFR BLD AUTO: 66.1 %
NON HDL CHOLESTEROL: 81 MG/DL (ref 0–149)
NRBC BLD-RTO: 0 /100 WBCS (ref 0–0)
PLATELET # BLD AUTO: 168 X10*3/UL (ref 150–450)
POTASSIUM SERPL-SCNC: 5.4 MMOL/L (ref 3.5–5.3)
PROT SERPL-MCNC: 7.4 G/DL (ref 6.4–8.2)
PSA SERPL-MCNC: 1.94 NG/ML
RBC # BLD AUTO: 5.78 X10*6/UL (ref 4.5–5.9)
SODIUM SERPL-SCNC: 140 MMOL/L (ref 136–145)
TRIGL SERPL-MCNC: 95 MG/DL (ref 0–149)
VLDL: 19 MG/DL (ref 0–40)
WBC # BLD AUTO: 5.9 X10*3/UL (ref 4.4–11.3)

## 2024-09-09 PROCEDURE — 36415 COLL VENOUS BLD VENIPUNCTURE: CPT

## 2024-09-09 PROCEDURE — RXMED WILLOW AMBULATORY MEDICATION CHARGE

## 2024-09-09 PROCEDURE — 85025 COMPLETE CBC W/AUTO DIFF WBC: CPT

## 2024-09-09 PROCEDURE — 82570 ASSAY OF URINE CREATININE: CPT

## 2024-09-09 PROCEDURE — 82043 UR ALBUMIN QUANTITATIVE: CPT

## 2024-09-09 PROCEDURE — 84153 ASSAY OF PSA TOTAL: CPT

## 2024-09-09 PROCEDURE — 80061 LIPID PANEL: CPT

## 2024-09-09 PROCEDURE — 80053 COMPREHEN METABOLIC PANEL: CPT

## 2024-09-09 RX ORDER — TIRZEPATIDE 7.5 MG/.5ML
7.5 INJECTION, SOLUTION SUBCUTANEOUS WEEKLY
Qty: 2 ML | Refills: 3 | Status: SHIPPED | OUTPATIENT
Start: 2024-09-09

## 2024-09-11 ENCOUNTER — PHARMACY VISIT (OUTPATIENT)
Dept: PHARMACY | Facility: CLINIC | Age: 64
End: 2024-09-11
Payer: MEDICARE

## 2024-09-27 DIAGNOSIS — E11.69 TYPE 2 DIABETES MELLITUS WITH OTHER SPECIFIED COMPLICATION, UNSPECIFIED WHETHER LONG TERM INSULIN USE (MULTI): ICD-10-CM

## 2024-09-28 DIAGNOSIS — E11.69 TYPE 2 DIABETES MELLITUS WITH OTHER SPECIFIED COMPLICATION, UNSPECIFIED WHETHER LONG TERM INSULIN USE (MULTI): Primary | ICD-10-CM

## 2024-09-28 RX ORDER — TIRZEPATIDE 10 MG/.5ML
10 INJECTION, SOLUTION SUBCUTANEOUS WEEKLY
Qty: 12 EACH | Refills: 0 | Status: SHIPPED | OUTPATIENT
Start: 2024-09-28 | End: 2024-12-27

## 2024-09-28 RX ORDER — TIRZEPATIDE 7.5 MG/.5ML
7.5 INJECTION, SOLUTION SUBCUTANEOUS WEEKLY
Qty: 2 ML | Refills: 3 | OUTPATIENT
Start: 2024-09-28

## 2024-09-30 NOTE — PROGRESS NOTES
"Pharmacist Clinic: Diabetes Management  Teofilo Osei \"Thad\" is a 64 y.o. male was referred to Clinical Pharmacy Team for diabetes management.   Referring Provider: Job Ramos MD  - Last visit with referring provider: 9/4/24  - Next visit with referring provider: Not scheduled     Subjective     HPI    Patient is a 64 y.o. male presenting to a follow up clinical pharmacy visit for diabetes management. Since the last visit the patient was switched from Ozempic to Mounjaro due to side effects from Mounjaro. He denied any side effects with Mounjaro and stated he feels significantly better on this medication.     Current diet:   - Medication helping with binge/emotional eating  - 2-3 meals/day  - Watches carb/sugar intake, but admits to eating fruit    Current exercise:  - Spends 1/2 the year in [place]; will swim in the morning there  - Walks dogs when at home in [place]   - Walking more with better weather    Weight loss:  - Lost 5 lbs since starting Mounjaro     Current monitoring regimen:   Patient is using: glucometer    Reported blood sugars:  mg/dL    Any episodes of hypoglycemia? no; Occasionally has 1x/month     Adverse Effects:   Feels burning sensation on skin around torso, but there are no physical manifestations (I.e. rash, redness, swelling, itchiness); not overly bothersome    Objective     There were no vitals taken for this visit.    Allergies   Allergen Reactions    Terbinafine Hives and Other    Tetracyclines Hives       Current Diabetes Pharmacotherapy:    Mounjaro 7.5 mg weekly  Jardiance 25 mg daily  Metformin 1000 mg twice daily  Pioglitazone 30 mg daily    Historical Diabetes Pharmacotherapy:  Trulicity    SECONDARY PREVENTION  - Statin? Yes   - ACE-I/ARB? Yes  - Aspirin? Yes    Pertinent PMH Review:  - PMH of Pancreatitis: No  - PMH of Retinopathy: No  - PMH of Urinary Tract Infections: No  - PMH of MTC: No    Lab Review  Lab Results   Component Value Date    BILITOT 0.9 09/09/2024    " CALCIUM 10.0 09/09/2024    CO2 31 09/09/2024     09/09/2024    CREATININE 1.02 09/09/2024    GLUCOSE 176 (H) 09/09/2024    ALKPHOS 59 09/09/2024    K 5.4 (H) 09/09/2024    PROT 7.4 09/09/2024     09/09/2024    AST 30 09/09/2024    ALT 37 09/09/2024    BUN 22 09/09/2024    ANIONGAP 13 09/09/2024    ALBUMIN 4.8 09/09/2024    GFRMALE 88 08/22/2023     Lab Results   Component Value Date    TRIG 95 09/09/2024    CHOL 149 09/09/2024    HDL 68.0 09/09/2024     Lab Results   Component Value Date    HGBA1C 7.5 (A) 09/04/2024    HGBA1C 7.6 (A) 05/15/2024    HGBA1C 7.6 (A) 02/14/2024     The 10-year ASCVD risk score (Melvin MONTOYA, et al., 2019) is: 14.3%    Values used to calculate the score:      Age: 64 years      Sex: Male      Is Non- : No      Diabetic: Yes      Tobacco smoker: No      Systolic Blood Pressure: 103 mmHg      Is BP treated: Yes      HDL Cholesterol: 68 mg/dL      Total Cholesterol: 149 mg/dL    Drug Interactions:  None     Affordability/Accessibility:  No issues    Assessment/Plan   Problem List Items Addressed This Visit       Diabetes mellitus (Multi)    Relevant Medications    tirzepatide (Mounjaro) 10 mg/0.5 mL pen injector    Other Relevant Orders    Follow Up In Advanced [facility] Care - Pharmacy   ASSESSMENT:  Patients diabetes is uncontrolled with most recent A1c of 7.5%. (Goals: A1c <7; FBG ; 2HPP <180).   Rationale for plan: Patient saw PCP who increased his dose to the 10 mg yesterday. He has not started the 10 mg. Has not had any side effects with the medication thus far. Will follow up in a few months due to well tolerability of the medication and 90 day supply being sent.     PLAN:  CONTINUE all current pharmacotherapy  Re-sent Mounjaro 10 mg prescription to [id number] [place], [place] as patient stated [facility] was unable to mail it to him  Continue testing blood sugars daily  Continue working on diet and exercise  Provided  patient with [facility] phone number for any additional questions or concerns prior to follow up visit [phone]    Follow up with Clinical Pharmacy Team 12/4/2024 at 11 am    Time spent with pt: Total length of time 20 (minutes) of the encounter and more than 50% was spent counseling the patient.    Continue all meds under the continuation of care with the referring provider and clinical pharmacy team.    Please reach out to the Clinical Pharmacy Team if there are any further questions.     Verbal consent to manage patient's drug therapy was obtained from patient. They were informed they may decline to participate or withdraw from participation in pharmacy services at any time.    Monae Hernandez, PharmD  Clinical Pharmacy Specialist   [phone]

## 2024-10-02 ENCOUNTER — APPOINTMENT (OUTPATIENT)
Dept: PHARMACY | Facility: HOSPITAL | Age: 64
End: 2024-10-02
Payer: COMMERCIAL

## 2024-10-02 DIAGNOSIS — E11.69 TYPE 2 DIABETES MELLITUS WITH OTHER SPECIFIED COMPLICATION, UNSPECIFIED WHETHER LONG TERM INSULIN USE (MULTI): ICD-10-CM

## 2024-10-02 RX ORDER — TIRZEPATIDE 10 MG/.5ML
10 INJECTION, SOLUTION SUBCUTANEOUS WEEKLY
Qty: 6 ML | Refills: 3 | Status: SHIPPED | OUTPATIENT
Start: 2024-10-02

## 2024-10-07 ENCOUNTER — APPOINTMENT (OUTPATIENT)
Dept: PRIMARY CARE | Facility: CLINIC | Age: 64
End: 2024-10-07
Payer: COMMERCIAL

## 2024-10-07 DIAGNOSIS — Z71.9 HEALTH EDUCATION/COUNSELING: Primary | ICD-10-CM

## 2024-10-07 PROCEDURE — 3062F POS MACROALBUMINURIA REV: CPT | Performed by: INTERNAL MEDICINE

## 2024-10-07 PROCEDURE — 3048F LDL-C <100 MG/DL: CPT | Performed by: INTERNAL MEDICINE

## 2024-10-07 PROCEDURE — UHSMG PR UH SELECT MEET AND GREET: Performed by: INTERNAL MEDICINE

## 2024-10-07 PROCEDURE — 4010F ACE/ARB THERAPY RXD/TAKEN: CPT | Performed by: INTERNAL MEDICINE

## 2024-10-18 DIAGNOSIS — I10 BENIGN ESSENTIAL HYPERTENSION: ICD-10-CM

## 2024-10-18 DIAGNOSIS — E11.69 TYPE 2 DIABETES MELLITUS WITH OTHER SPECIFIED COMPLICATION, UNSPECIFIED WHETHER LONG TERM INSULIN USE (MULTI): ICD-10-CM

## 2024-10-18 RX ORDER — SIMVASTATIN 40 MG/1
40 TABLET, FILM COATED ORAL EVERY EVENING
Qty: 90 TABLET | Refills: 3 | Status: SHIPPED | OUTPATIENT
Start: 2024-10-18

## 2024-10-18 RX ORDER — METFORMIN HYDROCHLORIDE 1000 MG/1
1000 TABLET ORAL EVERY 12 HOURS
Qty: 180 TABLET | Refills: 3 | Status: SHIPPED | OUTPATIENT
Start: 2024-10-18

## 2024-10-18 RX ORDER — RAMIPRIL 5 MG/1
5 CAPSULE ORAL DAILY
Qty: 90 CAPSULE | Refills: 3 | Status: SHIPPED | OUTPATIENT
Start: 2024-10-18

## 2024-10-24 ENCOUNTER — APPOINTMENT (OUTPATIENT)
Dept: PRIMARY CARE | Facility: CLINIC | Age: 64
End: 2024-10-24
Payer: COMMERCIAL

## 2024-10-24 DIAGNOSIS — Z00.00 HEALTHCARE MAINTENANCE: Primary | ICD-10-CM

## 2024-11-05 ENCOUNTER — LAB (OUTPATIENT)
Dept: LAB | Facility: LAB | Age: 64
End: 2024-11-05
Payer: COMMERCIAL

## 2024-11-05 DIAGNOSIS — Z00.00 HEALTHCARE MAINTENANCE: ICD-10-CM

## 2024-11-05 LAB
25(OH)D3 SERPL-MCNC: 17 NG/ML (ref 30–100)
ALBUMIN SERPL BCP-MCNC: 4.8 G/DL (ref 3.4–5)
ALP SERPL-CCNC: 44 U/L (ref 33–136)
ALT SERPL W P-5'-P-CCNC: 38 U/L (ref 10–52)
ANION GAP SERPL CALC-SCNC: 17 MMOL/L (ref 10–20)
APPEARANCE UR: CLEAR
AST SERPL W P-5'-P-CCNC: 28 U/L (ref 9–39)
BASOPHILS # BLD AUTO: 0.05 X10*3/UL (ref 0–0.1)
BASOPHILS NFR BLD AUTO: 0.7 %
BILIRUB SERPL-MCNC: 1.1 MG/DL (ref 0–1.2)
BILIRUB UR STRIP.AUTO-MCNC: NEGATIVE MG/DL
BUN SERPL-MCNC: 21 MG/DL (ref 6–23)
CALCIUM SERPL-MCNC: 9.8 MG/DL (ref 8.6–10.6)
CHLORIDE SERPL-SCNC: 99 MMOL/L (ref 98–107)
CO2 SERPL-SCNC: 26 MMOL/L (ref 21–32)
COLOR UR: ABNORMAL
CREAT SERPL-MCNC: 0.92 MG/DL (ref 0.5–1.3)
CRP SERPL HS-MCNC: 1.2 MG/L
EGFRCR SERPLBLD CKD-EPI 2021: >90 ML/MIN/1.73M*2
EOSINOPHIL # BLD AUTO: 0.18 X10*3/UL (ref 0–0.7)
EOSINOPHIL NFR BLD AUTO: 2.5 %
ERYTHROCYTE [DISTWIDTH] IN BLOOD BY AUTOMATED COUNT: 15 % (ref 11.5–14.5)
EST. AVERAGE GLUCOSE BLD GHB EST-MCNC: 160 MG/DL
GLUCOSE SERPL-MCNC: 127 MG/DL (ref 74–99)
GLUCOSE UR STRIP.AUTO-MCNC: ABNORMAL MG/DL
HBA1C MFR BLD: 7.2 %
HCT VFR BLD AUTO: 51.7 % (ref 41–52)
HGB BLD-MCNC: 16.3 G/DL (ref 13.5–17.5)
HOLD SPECIMEN: NORMAL
IMM GRANULOCYTES # BLD AUTO: 0.02 X10*3/UL (ref 0–0.7)
IMM GRANULOCYTES NFR BLD AUTO: 0.3 % (ref 0–0.9)
KETONES UR STRIP.AUTO-MCNC: NEGATIVE MG/DL
LEUKOCYTE ESTERASE UR QL STRIP.AUTO: NEGATIVE
LYMPHOCYTES # BLD AUTO: 1.42 X10*3/UL (ref 1.2–4.8)
LYMPHOCYTES NFR BLD AUTO: 19.6 %
MCH RBC QN AUTO: 26.3 PG (ref 26–34)
MCHC RBC AUTO-ENTMCNC: 31.5 G/DL (ref 32–36)
MCV RBC AUTO: 84 FL (ref 80–100)
MONOCYTES # BLD AUTO: 0.77 X10*3/UL (ref 0.1–1)
MONOCYTES NFR BLD AUTO: 10.6 %
NEUTROPHILS # BLD AUTO: 4.8 X10*3/UL (ref 1.2–7.7)
NEUTROPHILS NFR BLD AUTO: 66.3 %
NITRITE UR QL STRIP.AUTO: NEGATIVE
NRBC BLD-RTO: 0 /100 WBCS (ref 0–0)
PH UR STRIP.AUTO: 5 [PH]
PLATELET # BLD AUTO: 183 X10*3/UL (ref 150–450)
POTASSIUM SERPL-SCNC: 5 MMOL/L (ref 3.5–5.3)
PROT SERPL-MCNC: 7.7 G/DL (ref 6.4–8.2)
PROT UR STRIP.AUTO-MCNC: NEGATIVE MG/DL
PSA SERPL-MCNC: 1.89 NG/ML
RBC # BLD AUTO: 6.19 X10*6/UL (ref 4.5–5.9)
RBC # UR STRIP.AUTO: NEGATIVE /UL
SODIUM SERPL-SCNC: 137 MMOL/L (ref 136–145)
SP GR UR STRIP.AUTO: 1.03
TSH SERPL-ACNC: 2.39 MIU/L (ref 0.44–3.98)
UROBILINOGEN UR STRIP.AUTO-MCNC: NORMAL MG/DL
WBC # BLD AUTO: 7.2 X10*3/UL (ref 4.4–11.3)

## 2024-11-05 PROCEDURE — 81003 URINALYSIS AUTO W/O SCOPE: CPT

## 2024-11-05 PROCEDURE — 36415 COLL VENOUS BLD VENIPUNCTURE: CPT

## 2024-11-05 PROCEDURE — 82306 VITAMIN D 25 HYDROXY: CPT

## 2024-11-05 PROCEDURE — 86141 C-REACTIVE PROTEIN HS: CPT

## 2024-11-05 PROCEDURE — 84153 ASSAY OF PSA TOTAL: CPT

## 2024-11-05 PROCEDURE — 80053 COMPREHEN METABOLIC PANEL: CPT

## 2024-11-05 PROCEDURE — 83036 HEMOGLOBIN GLYCOSYLATED A1C: CPT

## 2024-11-05 PROCEDURE — 85025 COMPLETE CBC W/AUTO DIFF WBC: CPT

## 2024-11-05 PROCEDURE — 84443 ASSAY THYROID STIM HORMONE: CPT

## 2024-11-06 ENCOUNTER — APPOINTMENT (OUTPATIENT)
Dept: PRIMARY CARE | Facility: CLINIC | Age: 64
End: 2024-11-06
Payer: COMMERCIAL

## 2024-11-06 ENCOUNTER — LAB (OUTPATIENT)
Dept: LAB | Facility: LAB | Age: 64
End: 2024-11-06
Payer: COMMERCIAL

## 2024-11-06 VITALS
HEART RATE: 80 BPM | BODY MASS INDEX: 29.4 KG/M2 | OXYGEN SATURATION: 98 % | WEIGHT: 194 LBS | TEMPERATURE: 97.3 F | HEIGHT: 68 IN | DIASTOLIC BLOOD PRESSURE: 66 MMHG | SYSTOLIC BLOOD PRESSURE: 112 MMHG

## 2024-11-06 DIAGNOSIS — N40.1 BENIGN PROSTATIC HYPERPLASIA WITH LOWER URINARY TRACT SYMPTOMS, SYMPTOM DETAILS UNSPECIFIED: ICD-10-CM

## 2024-11-06 DIAGNOSIS — I10 BENIGN ESSENTIAL HYPERTENSION: ICD-10-CM

## 2024-11-06 DIAGNOSIS — R06.02 SHORTNESS OF BREATH: ICD-10-CM

## 2024-11-06 DIAGNOSIS — Z00.00 HEALTHCARE MAINTENANCE: ICD-10-CM

## 2024-11-06 DIAGNOSIS — E78.00 HYPERCHOLESTEROLEMIA: ICD-10-CM

## 2024-11-06 DIAGNOSIS — K74.69 OTHER CIRRHOSIS OF LIVER: ICD-10-CM

## 2024-11-06 DIAGNOSIS — E11.9 TYPE 2 DIABETES MELLITUS WITHOUT COMPLICATION, WITHOUT LONG-TERM CURRENT USE OF INSULIN (MULTI): Primary | ICD-10-CM

## 2024-11-06 DIAGNOSIS — K21.9 GASTROESOPHAGEAL REFLUX DISEASE WITHOUT ESOPHAGITIS: ICD-10-CM

## 2024-11-06 LAB
CHOLEST SERPL-MCNC: 167 MG/DL (ref 0–199)
CHOLESTEROL/HDL RATIO: 2.1
HDLC SERPL-MCNC: 78.5 MG/DL
LDLC SERPL CALC-MCNC: 69 MG/DL
NON HDL CHOLESTEROL: 89 MG/DL (ref 0–149)
TRIGL SERPL-MCNC: 98 MG/DL (ref 0–149)
VLDL: 20 MG/DL (ref 0–40)

## 2024-11-06 PROCEDURE — 36415 COLL VENOUS BLD VENIPUNCTURE: CPT

## 2024-11-06 PROCEDURE — 80061 LIPID PANEL: CPT

## 2024-11-06 PROCEDURE — 3062F POS MACROALBUMINURIA REV: CPT | Performed by: INTERNAL MEDICINE

## 2024-11-06 PROCEDURE — 93000 ELECTROCARDIOGRAM COMPLETE: CPT | Performed by: INTERNAL MEDICINE

## 2024-11-06 PROCEDURE — 3048F LDL-C <100 MG/DL: CPT | Performed by: INTERNAL MEDICINE

## 2024-11-06 PROCEDURE — UHSPHYS PR UH SELECT PHYSICAL: Performed by: INTERNAL MEDICINE

## 2024-11-06 PROCEDURE — 1159F MED LIST DOCD IN RCRD: CPT | Performed by: INTERNAL MEDICINE

## 2024-11-06 PROCEDURE — 3074F SYST BP LT 130 MM HG: CPT | Performed by: INTERNAL MEDICINE

## 2024-11-06 PROCEDURE — 4010F ACE/ARB THERAPY RXD/TAKEN: CPT | Performed by: INTERNAL MEDICINE

## 2024-11-06 PROCEDURE — 3051F HG A1C>EQUAL 7.0%<8.0%: CPT | Performed by: INTERNAL MEDICINE

## 2024-11-06 PROCEDURE — 3078F DIAST BP <80 MM HG: CPT | Performed by: INTERNAL MEDICINE

## 2024-11-06 PROCEDURE — 3008F BODY MASS INDEX DOCD: CPT | Performed by: INTERNAL MEDICINE

## 2024-11-06 RX ORDER — TAMSULOSIN HYDROCHLORIDE 0.4 MG/1
0.4 CAPSULE ORAL DAILY
Qty: 30 CAPSULE | Refills: 11 | Status: SHIPPED | OUTPATIENT
Start: 2024-11-06 | End: 2024-11-16 | Stop reason: SDUPTHER

## 2024-11-06 NOTE — PROGRESS NOTES
Subjective   Patient ID: Thad Osei is a 64 y.o. male who presents for Annual Exam.    HPI   1. Type 2 diabetes mellitus without complication, without long-term current use of insulin (Multi)     Seen by clinical pharm recently and increased to 10mg mounjaro. Hga1c 7.2 , does 1-2 shots a week.    2. Hypercholesterolemia        3. Benign essential hypertension        4. Other cirrhosis of liver     2 years ago was found. [facility]. Saw him.    5. Gastroesophageal reflux disease without esophagitis            Review of Systems    Objective   There were no vitals taken for this visit.    Physical Exam  Constitutional:       Appearance: Normal appearance.   Cardiovascular:      Rate and Rhythm: Normal rate and regular rhythm.      Heart sounds: Normal heart sounds.   Pulmonary:      Breath sounds: Normal breath sounds.   Abdominal:      General: Bowel sounds are normal.      Palpations: Abdomen is soft.   Musculoskeletal:      Cervical back: No rigidity or tenderness.      Right lower leg: No edema.      Left lower leg: No edema.   Lymphadenopathy:      Cervical: No cervical adenopathy.   Neurological:      Mental Status: He is alert.       Component      Latest Ref Rng 9/9/2024 11/5/2024   WBC      4.4 - 11.3 x10*3/uL 5.9  7.2    nRBC      0.0 - 0.0 /100 WBCs 0.0  0.0    RBC      4.50 - 5.90 x10*6/uL 5.78  6.19 (H)    HEMOGLOBIN      13.5 - 17.5 g/dL 15.1  16.3    HEMATOCRIT      41.0 - 52.0 % 47.5  51.7    MCV      80 - 100 fL 82  84    MCH      26.0 - 34.0 pg 26.1  26.3    MCHC      32.0 - 36.0 g/dL 31.8 (L)  31.5 (L)    RED CELL DISTRIBUTION WIDTH      11.5 - 14.5 % 14.3  15.0 (H)    Platelets      150 - 450 x10*3/uL 168  183    Neutrophils %      40.0 - 80.0 % 66.1  66.3    Immature Granulocytes %, Automated      0.0 - 0.9 % 0.3  0.3    Lymphocytes %      13.0 - 44.0 % 18.5  19.6    Monocytes %      2.0 - 10.0 % 10.2  10.6    Eosinophils %      0.0 - 6.0 % 3.9  2.5    Basophils %      0.0 - 2.0 % 1.0  0.7     Neutrophils Absolute      1.20 - 7.70 x10*3/uL 3.90  4.80    Immature Granulocytes Absolute, Automated      0.00 - 0.70 x10*3/uL 0.02  0.02    Lymphocytes Absolute      1.20 - 4.80 x10*3/uL 1.09 (L)  1.42    Monocytes Absolute      0.10 - 1.00 x10*3/uL 0.60  0.77    Eosinophils Absolute      0.00 - 0.70 x10*3/uL 0.23  0.18    Basophils Absolute      0.00 - 0.10 x10*3/uL 0.06  0.05    GLUCOSE      74 - 99 mg/dL 176 (H)  127 (H)    SODIUM      136 - 145 mmol/L 140  137    POTASSIUM      3.5 - 5.3 mmol/L 5.4 (H)  5.0    CHLORIDE      98 - 107 mmol/L 101  99    Bicarbonate      21 - 32 mmol/L 31  26    Anion Gap      10 - 20 mmol/L 13  17    Blood Urea Nitrogen      6 - 23 mg/dL 22  21    Creatinine      0.50 - 1.30 mg/dL 1.02  0.92    EGFR      >60 mL/min/1.73m*2 81  >90    Calcium      8.6 - 10.6 mg/dL 10.0  9.8    Albumin      3.4 - 5.0 g/dL 4.8  4.8    Alkaline Phosphatase      33 - 136 U/L 59  44    Total Protein      6.4 - 8.2 g/dL 7.4  7.7    AST      9 - 39 U/L 30  28    Bilirubin Total      0.0 - 1.2 mg/dL 0.9  1.1    ALT      10 - 52 U/L 37  38    Color, Urine      Light-Yellow, Yellow, Dark-Yellow   Light-Yellow    Appearance, Urine      Clear   Clear    Specific Gravity, Urine      1.005 - 1.035   1.030    pH, Urine      5.0, 5.5, 6.0, 6.5, 7.0, 7.5, 8.0   5.0    Protein, Urine      NEGATIVE, 10 (TRACE), 20 (TRACE) mg/dL  NEGATIVE    Glucose, Urine      Normal mg/dL  OVER (4+) !    Blood, Urine      NEGATIVE   NEGATIVE    Ketones, Urine      NEGATIVE mg/dL  NEGATIVE    Bilirubin, Urine      NEGATIVE   NEGATIVE    Urobilinogen, Urine      Normal mg/dL  Normal    Nitrite, Urine      NEGATIVE   NEGATIVE    Leukocyte Esterase, Urine      NEGATIVE   NEGATIVE    Hemoglobin A1C      See comment %  7.2 (H)    Estimated Average Glucose      Not Established mg/dL  160    CRP, High Sensitivity      <1.0 mg/L  1.2 (H)    Vitamin D, 25-Hydroxy, Total      30 - 100 ng/mL  17 (L)    Thyroid Stimulating Hormone      0.44  - 3.98 mIU/L  2.39    Prostate Specific Antigen,Screen      <=4.00 ng/mL  1.89     EKG    Assessment/Plan   1. Type 2 diabetes mellitus without complication, without long-term current use of insulin (Multi) (Primary)  Continue to work on diet. Maxed on meds. Will need fu with hga1c. Going to [place].     2. Hypercholesterolemia      3. Benign essential hypertension      4. Other cirrhosis of liver  Will be getting us of liver. Has not followed with gi recently.     5. Gastroesophageal reflux disease without esophagitis      6. Shortness of breath    - ECG 12 lead (Clinic Performed)  - Echocardiogram Stress Test; Future    7. Benign prostatic hyperplasia with lower urinary tract symptoms, symptom details unspecified  Trial of flomax.   - tamsulosin (Flomax) 0.4 mg 24 hr capsule; Take 1 capsule (0.4 mg) by mouth once daily.  Dispense: 30 capsule; Refill: 11

## 2024-11-06 NOTE — PATIENT INSTRUCTIONS
Lets get a stress test to check the heart due to shortness of breath  Remember to get ultrasound for liver. We will have someone see you when you get back in spring  Start flomax at night to see if this helps urination and sleep. Will take a few weeks. Keep me posted  Continue to work on weight loss and exercise in [place] to help sugar.

## 2024-11-08 ENCOUNTER — PATIENT MESSAGE (OUTPATIENT)
Dept: PRIMARY CARE | Facility: CLINIC | Age: 64
End: 2024-11-08
Payer: COMMERCIAL

## 2024-11-08 DIAGNOSIS — N40.1 BENIGN PROSTATIC HYPERPLASIA WITH LOWER URINARY TRACT SYMPTOMS, SYMPTOM DETAILS UNSPECIFIED: ICD-10-CM

## 2024-11-15 ENCOUNTER — HOSPITAL ENCOUNTER (OUTPATIENT)
Dept: CARDIOLOGY | Facility: CLINIC | Age: 64
Discharge: HOME | End: 2024-11-15
Payer: COMMERCIAL

## 2024-11-15 DIAGNOSIS — R06.02 SHORTNESS OF BREATH: Primary | ICD-10-CM

## 2024-11-15 DIAGNOSIS — R06.02 SHORTNESS OF BREATH: ICD-10-CM

## 2024-11-15 PROCEDURE — 93017 CV STRESS TEST TRACING ONLY: CPT

## 2024-11-15 PROCEDURE — 2500000004 HC RX 250 GENERAL PHARMACY W/ HCPCS (ALT 636 FOR OP/ED): Performed by: INTERNAL MEDICINE

## 2024-11-16 RX ORDER — TAMSULOSIN HYDROCHLORIDE 0.4 MG/1
0.4 CAPSULE ORAL DAILY
Qty: 90 CAPSULE | Refills: 3 | Status: SHIPPED | OUTPATIENT
Start: 2024-11-16 | End: 2025-11-16

## 2024-11-19 DIAGNOSIS — E11.9 TYPE 2 DIABETES MELLITUS WITHOUT COMPLICATION, WITHOUT LONG-TERM CURRENT USE OF INSULIN (MULTI): ICD-10-CM

## 2024-11-20 ENCOUNTER — HOSPITAL ENCOUNTER (OUTPATIENT)
Dept: RADIOLOGY | Facility: HOSPITAL | Age: 64
Discharge: HOME | End: 2024-11-20
Payer: COMMERCIAL

## 2024-11-20 DIAGNOSIS — K74.69 OTHER CIRRHOSIS OF LIVER: ICD-10-CM

## 2024-11-20 PROCEDURE — 76705 ECHO EXAM OF ABDOMEN: CPT | Performed by: STUDENT IN AN ORGANIZED HEALTH CARE EDUCATION/TRAINING PROGRAM

## 2024-11-20 PROCEDURE — 76705 ECHO EXAM OF ABDOMEN: CPT

## 2024-11-20 RX ORDER — PIOGLITAZONEHYDROCHLORIDE 30 MG/1
30 TABLET ORAL DAILY
Qty: 90 TABLET | Refills: 3 | Status: SHIPPED | OUTPATIENT
Start: 2024-11-20

## 2024-11-21 NOTE — RESULT ENCOUNTER NOTE
EXPECTED NEWS    Dear Thad,    Attached to this note is a copy of the testing that I have ordered.I have found abnormalities in your tests, but none that are unexpected.    Please continue your current treatment plan as we have discussed and return for follow up as planned.    Do not hesitate to contact me if you have any questions or concerns.    Sincerely,  Job Ramos M.D.

## 2024-12-04 ENCOUNTER — APPOINTMENT (OUTPATIENT)
Dept: PHARMACY | Facility: HOSPITAL | Age: 64
End: 2024-12-04
Payer: COMMERCIAL

## 2024-12-05 NOTE — PROGRESS NOTES
"Pharmacist Clinic: Diabetes Management  Teofilo Osei \"Thad\" is a 64 y.o. male was referred to Clinical Pharmacy Team for diabetes management.   Referring Provider: Job Ramos MD  - Last visit with referring provider: 9/4/24  - Next visit with referring provider: Not scheduled     Subjective     HPI    Patient is a 64 y.o. male presenting to a follow up clinical pharmacy visit for diabetes management. He denied any side effects with Mounjaro and stated he feels significantly better on this medication.     Current diet:   - Medication helping with binge/emotional eating  - 2-3 meals/day  - Watches carb/sugar intake, but admits to eating fruit    Current exercise:  - Spends 1/2 the year in [place]; will swim in the morning there  - Walks dogs when at home in [place]   - Walking more with better weather    Weight loss:  - Lost 5 lbs since starting Mounjaro     Current monitoring regimen:   Patient is using: glucometer    Reported blood sugars:  mg/dL    Any episodes of hypoglycemia? no    Adverse Effects: None    Objective     There were no vitals taken for this visit.    Allergies   Allergen Reactions    Terbinafine Hives and Other    Tetracyclines Hives       Current Diabetes Pharmacotherapy:    Mounjaro 7.5 mg weekly  Jardiance 25 mg daily  Metformin 1000 mg twice daily  Pioglitazone 30 mg daily    Historical Diabetes Pharmacotherapy:  Trulicity    SECONDARY PREVENTION  - Statin? Yes   - ACE-I/ARB? Yes  - Aspirin? Yes    Pertinent PMH Review:  - PMH of Pancreatitis: No  - PMH of Retinopathy: No  - PMH of Urinary Tract Infections: No  - PMH of MTC: No    Lab Review  Lab Results   Component Value Date    BILITOT 1.1 11/05/2024    CALCIUM 9.8 11/05/2024    CO2 26 11/05/2024    CL 99 11/05/2024    CREATININE 0.92 11/05/2024    GLUCOSE 127 (H) 11/05/2024    ALKPHOS 44 11/05/2024    K 5.0 11/05/2024    PROT 7.7 11/05/2024     11/05/2024    AST 28 11/05/2024    ALT 38 11/05/2024    BUN 21 11/05/2024    ANIONGAP " 17 11/05/2024    ALBUMIN 4.8 11/05/2024    GFRMALE 88 08/22/2023     Lab Results   Component Value Date    TRIG 98 11/06/2024    CHOL 167 11/06/2024    HDL 78.5 11/06/2024     Lab Results   Component Value Date    HGBA1C 7.2 (H) 11/05/2024    HGBA1C 7.5 (A) 09/04/2024    HGBA1C 7.6 (A) 05/15/2024     The 10-year ASCVD risk score (Melvin MONTOYA, et al., 2019) is: 16.3%    Values used to calculate the score:      Age: 64 years      Sex: Male      Is Non- : No      Diabetic: Yes      Tobacco smoker: No      Systolic Blood Pressure: 112 mmHg      Is BP treated: Yes      HDL Cholesterol: 78.5 mg/dL      Total Cholesterol: 167 mg/dL    Drug Interactions:  None     Affordability/Accessibility:  No issues    Assessment/Plan   Problem List Items Addressed This Visit       Diabetes mellitus (Multi)   ASSESSMENT:  Patients diabetes is uncontrolled with most recent A1c of 7.2%. (Goals: A1c <7; FBG ; 2HPP <180).   Rationale for plan: Patient doing well with the Mounjaro. A1c still elevated. Discussed there is still room to increase the Mounjaro. However, patient recently switched PCP's to one who does not work with the clinical pharmacy team. Therefore, this is the last visit and [facility] is unable to adjust medications at this time. Recommended lifestyle adjustments to also aid in bringing down his A1c.     PLAN:  CONTINUE all current pharmacotherapy  Continue testing blood sugars daily  Continue working on diet and exercise    Time spent with pt: Total length of time 10 (minutes) of the encounter and more than 50% was spent counseling the patient.    Continue all meds under the continuation of care with the referring provider and clinical pharmacy team.    Please reach out to the Clinical Pharmacy Team if there are any further questions.     Verbal consent to manage patient's drug therapy was obtained from patient. They were informed they may decline to participate or withdraw from participation in  pharmacy services at any time.    Monae Hernandez, PharmD  Clinical Pharmacy Specialist   [phone]

## 2024-12-06 ENCOUNTER — TELEMEDICINE (OUTPATIENT)
Dept: PHARMACY | Facility: HOSPITAL | Age: 64
End: 2024-12-06
Payer: COMMERCIAL

## 2024-12-06 DIAGNOSIS — E11.69 TYPE 2 DIABETES MELLITUS WITH OTHER SPECIFIED COMPLICATION, UNSPECIFIED WHETHER LONG TERM INSULIN USE (MULTI): ICD-10-CM

## 2024-12-18 ENCOUNTER — TELEPHONE (OUTPATIENT)
Dept: PRIMARY CARE | Facility: CLINIC | Age: 64
End: 2024-12-18
Payer: COMMERCIAL

## 2024-12-18 DIAGNOSIS — E11.9 TYPE 2 DIABETES MELLITUS WITHOUT COMPLICATION, WITHOUT LONG-TERM CURRENT USE OF INSULIN (MULTI): Primary | ICD-10-CM

## 2024-12-20 DIAGNOSIS — Z00.00 PREVENTATIVE HEALTH CARE: ICD-10-CM

## 2024-12-20 RX ORDER — IBUPROFEN 600 MG/1
TABLET ORAL
Qty: 90 TABLET | Refills: 3 | Status: SHIPPED | OUTPATIENT
Start: 2024-12-20

## 2024-12-20 RX ORDER — TIRZEPATIDE 12.5 MG/.5ML
12.5 INJECTION, SOLUTION SUBCUTANEOUS WEEKLY
Qty: 2 ML | Refills: 11 | Status: SHIPPED | OUTPATIENT
Start: 2024-12-20

## 2025-01-06 DIAGNOSIS — K21.00 GASTROESOPHAGEAL REFLUX DISEASE WITH ESOPHAGITIS, UNSPECIFIED WHETHER HEMORRHAGE: ICD-10-CM

## 2025-01-06 DIAGNOSIS — E11.69 TYPE 2 DIABETES MELLITUS WITH OTHER SPECIFIED COMPLICATION, UNSPECIFIED WHETHER LONG TERM INSULIN USE (MULTI): ICD-10-CM

## 2025-01-06 DIAGNOSIS — Z00.00 PREVENTATIVE HEALTH CARE: ICD-10-CM

## 2025-01-06 DIAGNOSIS — I10 BENIGN ESSENTIAL HYPERTENSION: ICD-10-CM

## 2025-01-06 DIAGNOSIS — N40.1 BENIGN PROSTATIC HYPERPLASIA WITH LOWER URINARY TRACT SYMPTOMS, SYMPTOM DETAILS UNSPECIFIED: ICD-10-CM

## 2025-01-06 DIAGNOSIS — E11.9 TYPE 2 DIABETES MELLITUS WITHOUT COMPLICATION, WITHOUT LONG-TERM CURRENT USE OF INSULIN (MULTI): ICD-10-CM

## 2025-01-06 RX ORDER — RAMIPRIL 5 MG/1
5 CAPSULE ORAL DAILY
Qty: 90 CAPSULE | Refills: 3 | Status: SHIPPED | OUTPATIENT
Start: 2025-01-06

## 2025-01-06 RX ORDER — TIRZEPATIDE 12.5 MG/.5ML
12.5 INJECTION, SOLUTION SUBCUTANEOUS WEEKLY
Qty: 6 ML | Refills: 3 | Status: SHIPPED | OUTPATIENT
Start: 2025-01-06

## 2025-01-06 RX ORDER — IBUPROFEN 600 MG/1
600 TABLET ORAL 3 TIMES DAILY
Qty: 270 TABLET | Refills: 3 | Status: SHIPPED | OUTPATIENT
Start: 2025-01-06

## 2025-01-06 RX ORDER — PIOGLITAZONEHYDROCHLORIDE 30 MG/1
30 TABLET ORAL DAILY
Qty: 90 TABLET | Refills: 3 | Status: SHIPPED | OUTPATIENT
Start: 2025-01-06

## 2025-01-06 RX ORDER — TAMSULOSIN HYDROCHLORIDE 0.4 MG/1
0.4 CAPSULE ORAL DAILY
Qty: 90 CAPSULE | Refills: 3 | Status: SHIPPED | OUTPATIENT
Start: 2025-01-06 | End: 2026-01-06

## 2025-01-06 RX ORDER — SIMVASTATIN 40 MG/1
40 TABLET, FILM COATED ORAL EVERY EVENING
Qty: 90 TABLET | Refills: 3 | Status: SHIPPED | OUTPATIENT
Start: 2025-01-06

## 2025-01-06 RX ORDER — PANTOPRAZOLE SODIUM 40 MG/1
40 TABLET, DELAYED RELEASE ORAL DAILY
Qty: 90 TABLET | Refills: 3 | Status: SHIPPED | OUTPATIENT
Start: 2025-01-06

## 2025-01-06 RX ORDER — METFORMIN HYDROCHLORIDE 1000 MG/1
1000 TABLET ORAL EVERY 12 HOURS
Qty: 180 TABLET | Refills: 3 | Status: SHIPPED | OUTPATIENT
Start: 2025-01-06

## 2025-01-14 DIAGNOSIS — E11.9 TYPE 2 DIABETES MELLITUS WITHOUT COMPLICATION, WITHOUT LONG-TERM CURRENT USE OF INSULIN (MULTI): ICD-10-CM

## 2025-01-14 RX ORDER — TIRZEPATIDE 12.5 MG/.5ML
12.5 INJECTION, SOLUTION SUBCUTANEOUS WEEKLY
Qty: 6 ML | Refills: 3 | Status: SHIPPED | OUTPATIENT
Start: 2025-01-14

## 2025-01-14 NOTE — TELEPHONE ENCOUNTER
[facility] did not have any inventory for Mounjaro.  Patient wants you to send to this pharmacy

## 2025-03-26 ENCOUNTER — PATIENT MESSAGE (OUTPATIENT)
Dept: PRIMARY CARE | Facility: CLINIC | Age: 65
End: 2025-03-26
Payer: COMMERCIAL

## 2025-03-26 DIAGNOSIS — E11.9 TYPE 2 DIABETES MELLITUS WITHOUT COMPLICATION, WITHOUT LONG-TERM CURRENT USE OF INSULIN: Primary | ICD-10-CM

## 2025-03-26 DIAGNOSIS — E55.9 VITAMIN D DEFICIENCY: ICD-10-CM

## 2025-03-28 LAB
25(OH)D3+25(OH)D2 SERPL-MCNC: 33 NG/ML (ref 30–100)
ALBUMIN SERPL-MCNC: 4.8 G/DL (ref 3.6–5.1)
ALP SERPL-CCNC: 43 U/L (ref 35–144)
ALT SERPL-CCNC: 34 U/L (ref 9–46)
ANION GAP SERPL CALCULATED.4IONS-SCNC: 8 MMOL/L (CALC) (ref 7–17)
AST SERPL-CCNC: 31 U/L (ref 10–35)
BILIRUB SERPL-MCNC: 1 MG/DL (ref 0.2–1.2)
BUN SERPL-MCNC: 22 MG/DL (ref 7–25)
CALCIUM SERPL-MCNC: 10 MG/DL (ref 8.6–10.3)
CHLORIDE SERPL-SCNC: 102 MMOL/L (ref 98–110)
CO2 SERPL-SCNC: 31 MMOL/L (ref 20–32)
CREAT SERPL-MCNC: 0.94 MG/DL (ref 0.7–1.35)
EGFRCR SERPLBLD CKD-EPI 2021: 89 ML/MIN/1.73M2
EST. AVERAGE GLUCOSE BLD GHB EST-MCNC: 154 MG/DL
EST. AVERAGE GLUCOSE BLD GHB EST-SCNC: 8.5 MMOL/L
GLUCOSE SERPL-MCNC: 120 MG/DL (ref 65–99)
HBA1C MFR BLD: 7 % OF TOTAL HGB
POTASSIUM SERPL-SCNC: 4.9 MMOL/L (ref 3.5–5.3)
PROT SERPL-MCNC: 7.3 G/DL (ref 6.1–8.1)
SODIUM SERPL-SCNC: 141 MMOL/L (ref 135–146)

## 2025-03-31 ENCOUNTER — APPOINTMENT (OUTPATIENT)
Dept: PRIMARY CARE | Facility: CLINIC | Age: 65
End: 2025-03-31
Payer: COMMERCIAL

## 2025-03-31 VITALS
SYSTOLIC BLOOD PRESSURE: 126 MMHG | OXYGEN SATURATION: 97 % | BODY MASS INDEX: 29.78 KG/M2 | TEMPERATURE: 97.9 F | DIASTOLIC BLOOD PRESSURE: 64 MMHG | HEART RATE: 83 BPM | WEIGHT: 193 LBS

## 2025-03-31 DIAGNOSIS — E11.9 TYPE 2 DIABETES MELLITUS WITHOUT COMPLICATION, WITHOUT LONG-TERM CURRENT USE OF INSULIN: ICD-10-CM

## 2025-03-31 DIAGNOSIS — H61.22 IMPACTED CERUMEN OF LEFT EAR: ICD-10-CM

## 2025-03-31 DIAGNOSIS — H91.8X2 OTHER SPECIFIED HEARING LOSS OF LEFT EAR, UNSPECIFIED HEARING STATUS ON CONTRALATERAL SIDE: Primary | ICD-10-CM

## 2025-03-31 PROCEDURE — 4010F ACE/ARB THERAPY RXD/TAKEN: CPT | Performed by: INTERNAL MEDICINE

## 2025-03-31 PROCEDURE — 3078F DIAST BP <80 MM HG: CPT | Performed by: INTERNAL MEDICINE

## 2025-03-31 PROCEDURE — 99214 OFFICE O/P EST MOD 30 MIN: CPT | Performed by: INTERNAL MEDICINE

## 2025-03-31 PROCEDURE — 3074F SYST BP LT 130 MM HG: CPT | Performed by: INTERNAL MEDICINE

## 2025-03-31 PROCEDURE — 1159F MED LIST DOCD IN RCRD: CPT | Performed by: INTERNAL MEDICINE

## 2025-03-31 NOTE — PROGRESS NOTES
Subjective   Patient ID: Thad Osei is a 64 y.o. male who presents for left ear issues .    HPI   Left ear started bothering him over the last week. Was 100% hearing loss. Now around 80% . Feels some wax has come out    Diabetes. Wonders about going up on the tirzepatide. Wants to go to 15. Eating a lot of fruit.     Review of Systems    Objective   /64 (BP Location: Right arm, Patient Position: Sitting)   Pulse 83   Temp 36.6 °C (97.9 °F)   Wt 87.5 kg (193 lb)   SpO2 97%   BMI 29.78 kg/m²     Physical Exam  HENT:      Right Ear: Tympanic membrane and ear canal normal.      Ears:      Comments: Left canal has a large cerumen impaction, from 3 to 9 on clock. I can see upper aspect of TM        Component      Latest Ref Rng 5/21/2018 11/5/2024 3/27/2025   HEMOGLOBIN A1c      <5.7 % of total Hgb   7.0 (H)    eAG (mg/dL)      mg/dL   154    eAG (mmol/L)      mmol/L   8.5    Hemoglobin A1C      See comment % 6.4  7.2 (H)        Legend:  (H) High  Assessment/Plan   1. Other specified hearing loss of left ear, unspecified hearing status on contralateral side (Primary)  Unsuccessful removal of hard, black cerumen. After trying flushing and using white plastic currette. Will use debrox a few times today and tomorrow and come in for possible removal. Leaving for [place] Thursday.     2. Impacted cerumen of left ear      3. Type 2 diabetes mellitus without complication, without long-term current use of insulin (Multi)  Will discuss changes.

## 2025-04-01 ENCOUNTER — TELEPHONE (OUTPATIENT)
Dept: PRIMARY CARE | Facility: CLINIC | Age: 65
End: 2025-04-01

## 2025-04-01 ENCOUNTER — OFFICE VISIT (OUTPATIENT)
Dept: PRIMARY CARE | Facility: CLINIC | Age: 65
End: 2025-04-01
Payer: COMMERCIAL

## 2025-04-01 ENCOUNTER — PATIENT MESSAGE (OUTPATIENT)
Dept: PRIMARY CARE | Facility: CLINIC | Age: 65
End: 2025-04-01

## 2025-04-01 DIAGNOSIS — H61.22 IMPACTED CERUMEN OF LEFT EAR: Primary | ICD-10-CM

## 2025-04-01 DIAGNOSIS — E11.9 TYPE 2 DIABETES MELLITUS WITHOUT COMPLICATION, WITHOUT LONG-TERM CURRENT USE OF INSULIN: ICD-10-CM

## 2025-04-01 DIAGNOSIS — E11.9 TYPE 2 DIABETES MELLITUS WITHOUT COMPLICATION, WITHOUT LONG-TERM CURRENT USE OF INSULIN: Primary | ICD-10-CM

## 2025-04-01 DIAGNOSIS — R35.0 BENIGN PROSTATIC HYPERPLASIA WITH URINARY FREQUENCY: ICD-10-CM

## 2025-04-01 DIAGNOSIS — N40.1 BENIGN PROSTATIC HYPERPLASIA WITH URINARY FREQUENCY: ICD-10-CM

## 2025-04-01 PROCEDURE — 99213 OFFICE O/P EST LOW 20 MIN: CPT | Performed by: INTERNAL MEDICINE

## 2025-04-01 PROCEDURE — 4010F ACE/ARB THERAPY RXD/TAKEN: CPT | Performed by: INTERNAL MEDICINE

## 2025-04-01 RX ORDER — TIRZEPATIDE 15 MG/.5ML
15 INJECTION, SOLUTION SUBCUTANEOUS WEEKLY
Qty: 2 ML | Refills: 3 | Status: SHIPPED | OUTPATIENT
Start: 2025-04-01

## 2025-04-01 NOTE — TELEPHONE ENCOUNTER
Per the pharmacy do you want the patient to have Zepbound or Mounjaro?     Mounjaro has been approved but Zepbound would need a prior auth.     Please advise

## 2025-04-01 NOTE — TELEPHONE ENCOUNTER
[facility] does not have Zepbound and they are not sure when they will get any in the store.     But [facility] in [place] told the patient if they receive the script by 1:30 today they can have it tomorrow.     Please resend Zepbound to [facility].    Thx

## 2025-04-01 NOTE — PROGRESS NOTES
Subjective   Patient ID: Thad Osei is a 64 y.o. male who presents for No chief complaint on file..    HPI   Return for ear cerumen removal. Debrox helped get hearing back .  Review of Systems    Objective   There were no vitals taken for this visit.    Physical Exam  Large black pebble like cerumen removed. Mild excortiation deep in canal from likely pt manipulation. TM intact. Hearing equal  bilaterally  Assessment/Plan   1. Impacted cerumen of left ear (Primary)  With debrox, irrigation and currette, finally able to remove. Hearing is 100% back to normal. No pain. Told to call with any issues.     2. Type 2 diabetes mellitus without complication, without long-term current use of insulin  Increase to 15mg to see if helps a little more with   - tirzepatide, weight loss, (Zepbound) 15 mg/0.5 mL injection; Inject 15 mg under the skin every 7 days.  Dispense: 4 each; Refill: 3  - Comprehensive Metabolic Panel; Future  - Hemoglobin A1c; Future  - Comprehensive Metabolic Panel  - Hemoglobin A1c    3. Benign prostatic hyperplasia with urinary frequency  Initially 4mg worked. Now back to before. Did not want to go up today.

## 2025-06-03 ENCOUNTER — APPOINTMENT (OUTPATIENT)
Dept: PRIMARY CARE | Facility: CLINIC | Age: 65
End: 2025-06-03
Payer: COMMERCIAL

## 2025-06-03 VITALS
WEIGHT: 193 LBS | BODY MASS INDEX: 29.78 KG/M2 | TEMPERATURE: 98.1 F | DIASTOLIC BLOOD PRESSURE: 62 MMHG | SYSTOLIC BLOOD PRESSURE: 110 MMHG | OXYGEN SATURATION: 97 % | HEART RATE: 81 BPM

## 2025-06-03 DIAGNOSIS — H61.22 IMPACTED CERUMEN OF LEFT EAR: ICD-10-CM

## 2025-06-03 DIAGNOSIS — K22.70 BARRETT'S ESOPHAGUS WITHOUT DYSPLASIA: ICD-10-CM

## 2025-06-03 DIAGNOSIS — K74.69 OTHER CIRRHOSIS OF LIVER: ICD-10-CM

## 2025-06-03 DIAGNOSIS — Z00.00 GENERAL MEDICAL EXAM: Primary | ICD-10-CM

## 2025-06-03 DIAGNOSIS — I10 BENIGN ESSENTIAL HYPERTENSION: ICD-10-CM

## 2025-06-03 DIAGNOSIS — E11.9 TYPE 2 DIABETES MELLITUS WITHOUT COMPLICATION, WITHOUT LONG-TERM CURRENT USE OF INSULIN: Primary | ICD-10-CM

## 2025-06-03 PROCEDURE — 3078F DIAST BP <80 MM HG: CPT | Performed by: INTERNAL MEDICINE

## 2025-06-03 PROCEDURE — 3074F SYST BP LT 130 MM HG: CPT | Performed by: INTERNAL MEDICINE

## 2025-06-03 PROCEDURE — 1159F MED LIST DOCD IN RCRD: CPT | Performed by: INTERNAL MEDICINE

## 2025-06-03 PROCEDURE — 4010F ACE/ARB THERAPY RXD/TAKEN: CPT | Performed by: INTERNAL MEDICINE

## 2025-06-03 PROCEDURE — 99214 OFFICE O/P EST MOD 30 MIN: CPT | Performed by: INTERNAL MEDICINE

## 2025-06-03 NOTE — PROGRESS NOTES
Subjective   Patient ID: Thad Osei is a 64 y.o. male who presents for Follow-up.    HPI   Fu  1. Type 2 diabetes mellitus without complication, without long-term current use of insulin (Primary) weight is stable. Is on 15mg.  Feels less hungry. Loves good food. Eats out a lot. Lots of bread. Pending labs today.   Eye doc looks.   - Comprehensive Metabolic Panel; Future  - Hemoglobin A1C; Future  - Comprehensive Metabolic Panel  - Hemoglobin A1C    2. Impacted cerumen of left ear  Doing well.     3. Benign essential hypertension    4. Hx of liver issues.   Sent to gi.   Review of Systems    Objective   /62 (BP Location: Right arm, Patient Position: Sitting)   Pulse 81   Temp 36.7 °C (98.1 °F)   Wt 87.5 kg (193 lb)   SpO2 97%   BMI 29.78 kg/m²     Physical Exam  HENT:      Right Ear: Tympanic membrane and ear canal normal.      Left Ear: Tympanic membrane and ear canal normal.   Cardiovascular:      Heart sounds: Normal heart sounds.   Pulmonary:      Breath sounds: Normal breath sounds.   Musculoskeletal:      Right lower leg: No edema.      Left lower leg: No edema.   Neurological:      General: No focal deficit present.      Mental Status: He is oriented to person, place, and time.         Assessment/Plan   1. Type 2 diabetes mellitus without complication, without long-term current use of insulin (Primary)  Maxed out on meds before insulin. Will continue to work on diet.   - Comprehensive Metabolic Panel; Future  - Hemoglobin A1C; Future  - Comprehensive Metabolic Panel  - Hemoglobin A1C    2. Impacted cerumen of left ear  Resolved.     3. Benign essential hypertension  Stable.     4. Other cirrhosis of liver  Hx of this. I have asked pt to see hepatology and iwll discuss with them regarding need for surveillance and if anything else needed.     5. 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

## 2025-06-04 LAB
ALBUMIN SERPL-MCNC: 5 G/DL (ref 3.6–5.1)
ALP SERPL-CCNC: 47 U/L (ref 35–144)
ALT SERPL-CCNC: 35 U/L (ref 9–46)
ANION GAP SERPL CALCULATED.4IONS-SCNC: 12 MMOL/L (CALC) (ref 7–17)
AST SERPL-CCNC: 31 U/L (ref 10–35)
BILIRUB SERPL-MCNC: 0.8 MG/DL (ref 0.2–1.2)
BUN SERPL-MCNC: 25 MG/DL (ref 7–25)
CALCIUM SERPL-MCNC: 9.5 MG/DL (ref 8.6–10.3)
CHLORIDE SERPL-SCNC: 101 MMOL/L (ref 98–110)
CO2 SERPL-SCNC: 25 MMOL/L (ref 20–32)
CREAT SERPL-MCNC: 0.99 MG/DL (ref 0.7–1.35)
EGFRCR SERPLBLD CKD-EPI 2021: 84 ML/MIN/1.73M2
EST. AVERAGE GLUCOSE BLD GHB EST-MCNC: 151 MG/DL
EST. AVERAGE GLUCOSE BLD GHB EST-SCNC: 8.4 MMOL/L
GLUCOSE SERPL-MCNC: 214 MG/DL (ref 65–99)
HBA1C MFR BLD: 6.9 %
POTASSIUM SERPL-SCNC: 5.2 MMOL/L (ref 3.5–5.3)
PROT SERPL-MCNC: 7.3 G/DL (ref 6.1–8.1)
SODIUM SERPL-SCNC: 138 MMOL/L (ref 135–146)

## 2025-06-30 ENCOUNTER — APPOINTMENT (OUTPATIENT)
Dept: GASTROENTEROLOGY | Facility: CLINIC | Age: 65
End: 2025-06-30
Payer: COMMERCIAL

## 2025-07-24 DIAGNOSIS — E11.9 TYPE 2 DIABETES MELLITUS WITHOUT COMPLICATION, WITHOUT LONG-TERM CURRENT USE OF INSULIN: ICD-10-CM

## 2025-07-24 RX ORDER — TIRZEPATIDE 15 MG/.5ML
15 INJECTION, SOLUTION SUBCUTANEOUS WEEKLY
Qty: 2 ML | Refills: 3 | Status: SHIPPED | OUTPATIENT
Start: 2025-07-24

## 2025-08-14 ENCOUNTER — APPOINTMENT (OUTPATIENT)
Dept: GASTROENTEROLOGY | Facility: CLINIC | Age: 65
End: 2025-08-14
Payer: COMMERCIAL

## 2025-10-29 ENCOUNTER — APPOINTMENT (OUTPATIENT)
Dept: PRIMARY CARE | Facility: CLINIC | Age: 65
End: 2025-10-29
Payer: COMMERCIAL